# Patient Record
Sex: FEMALE | Race: WHITE | NOT HISPANIC OR LATINO | Employment: FULL TIME | URBAN - METROPOLITAN AREA
[De-identification: names, ages, dates, MRNs, and addresses within clinical notes are randomized per-mention and may not be internally consistent; named-entity substitution may affect disease eponyms.]

---

## 2020-12-02 ENCOUNTER — OFFICE VISIT (OUTPATIENT)
Dept: FAMILY MEDICINE CLINIC | Facility: CLINIC | Age: 35
End: 2020-12-02

## 2020-12-02 VITALS
WEIGHT: 202 LBS | SYSTOLIC BLOOD PRESSURE: 104 MMHG | HEART RATE: 72 BPM | OXYGEN SATURATION: 99 % | TEMPERATURE: 96.8 F | DIASTOLIC BLOOD PRESSURE: 62 MMHG | HEIGHT: 69 IN | BODY MASS INDEX: 29.92 KG/M2

## 2020-12-02 DIAGNOSIS — E03.9 HYPOTHYROIDISM, UNSPECIFIED TYPE: ICD-10-CM

## 2020-12-02 DIAGNOSIS — E06.3 HASHIMOTO'S DISEASE: Primary | ICD-10-CM

## 2020-12-02 DIAGNOSIS — R63.5 WEIGHT GAIN: ICD-10-CM

## 2020-12-02 PROBLEM — M47.16 LUMBAR SPONDYLOSIS WITH MYELOPATHY: Status: ACTIVE | Noted: 2020-08-23

## 2020-12-02 PROBLEM — S99.912A ANKLE INJURY, LEFT, INITIAL ENCOUNTER: Status: ACTIVE | Noted: 2020-08-22

## 2020-12-02 PROBLEM — M51.16 LUMBAR DISC HERNIATION WITH RADICULOPATHY: Status: ACTIVE | Noted: 2020-08-22

## 2020-12-02 PROCEDURE — 99213 OFFICE O/P EST LOW 20 MIN: CPT | Performed by: INTERNAL MEDICINE

## 2020-12-02 RX ORDER — DIPHENHYDRAMINE HCL 25 MG
25 CAPSULE ORAL AS NEEDED
COMMUNITY

## 2020-12-02 RX ORDER — LEVOTHYROXINE SODIUM 125 MCG
125 TABLET ORAL
COMMUNITY
Start: 2020-10-22 | End: 2021-08-18 | Stop reason: SDUPTHER

## 2020-12-02 NOTE — PROGRESS NOTES
MIRLANDE Huber is a 34 y.o. female.     Chief Complaint   Patient presents with   • Hypothyroidism   • Weight Check     going up       History of Present Illness   History of hypothyroidism.  Taking Synthroid 125 mcg daily.  Reports she usually has a TSH level about 2.5.  Last time at 3.77.  She was on keto diet it actually caused her reflex  Weight gain.  She has gained more than 30 pounds.  She is with a  dieting exercising.  She is very specific about diet and exercise.  Continues to gain weight.  Feels tired.  Last time she had a vitamin D level checked was 23.  The following portions of the patient's history were reviewed and updated as appropriate: allergies, current medications, past family history, past medical history, past social history, past surgical history and problem list.    Review of Systems   Constitutional: Negative.  Negative for activity change, appetite change, fatigue and fever.   Eyes: Negative for blurred vision and double vision.   Respiratory: Negative.  Negative for shortness of breath.    Cardiovascular: Negative for chest pain, palpitations and leg swelling.   Gastrointestinal: Negative.    Musculoskeletal: Negative for arthralgias and back pain.   Neurological: Negative for dizziness, syncope, light-headedness and headache.   Psychiatric/Behavioral: Negative for negative for hyperactivity and depressed mood.       No Known Allergies    Current Outpatient Medications on File Prior to Visit   Medication Sig Dispense Refill   • diphenhydrAMINE (BENADRYL) 25 mg capsule Take 25 mg by mouth As Needed.     • Synthroid 125 MCG tablet Take 125 tablets by mouth Every Morning Before Breakfast.       No current facility-administered medications on file prior to visit.        History reviewed. No pertinent family history.    Past Medical History:   Diagnosis Date   • Hypothyroidism        History reviewed. No pertinent surgical history.    Social History     Socioeconomic  "History   • Marital status: Single     Spouse name: Not on file   • Number of children: Not on file   • Years of education: Not on file   • Highest education level: Not on file   Tobacco Use   • Smoking status: Never Smoker   • Smokeless tobacco: Never Used   Substance and Sexual Activity   • Alcohol use: Never     Frequency: Never   • Drug use: Never   • Sexual activity: Defer       Patient Active Problem List   Diagnosis   • Ankle injury, left, initial encounter   • Benign neoplasm of cerebral meninges (CMS/HCC)   • Lumbar disc herniation with radiculopathy   • Lumbar spondylosis with myelopathy   • Hashimoto's disease   • Hypothyroidism       /62 (BP Location: Right arm, Patient Position: Sitting, Cuff Size: Adult)   Pulse 72   Temp 96.8 °F (36 °C) (Temporal)   Ht 174 cm (68.5\")   Wt 91.6 kg (202 lb)   SpO2 99%   BMI 30.27 kg/m²   Body mass index is 30.27 kg/m².    Objective   Physical Exam  Vitals signs and nursing note reviewed.   Constitutional:       Appearance: She is well-developed.   Eyes:      Pupils: Pupils are equal, round, and reactive to light.   Neck:      Musculoskeletal: Normal range of motion and neck supple.      Thyroid: No thyromegaly.      Vascular: No JVD.      Trachea: No tracheal deviation.   Cardiovascular:      Rate and Rhythm: Normal rate and regular rhythm.      Heart sounds: No murmur.   Pulmonary:      Effort: Pulmonary effort is normal. No respiratory distress.      Breath sounds: Normal breath sounds. No wheezing.   Abdominal:      General: Bowel sounds are normal. There is no distension.      Palpations: Abdomen is soft. There is no mass.      Tenderness: There is no abdominal tenderness. There is no right CVA tenderness, left CVA tenderness, guarding or rebound.      Hernia: No hernia is present.   Lymphadenopathy:      Cervical: No cervical adenopathy.   Neurological:      General: No focal deficit present.      Mental Status: She is alert and oriented to person, " place, and time. Mental status is at baseline.      Cranial Nerves: No cranial nerve deficit.      Sensory: No sensory deficit.      Motor: No weakness.      Coordination: Coordination normal.      Gait: Gait normal.      Deep Tendon Reflexes: Reflexes normal.   Psychiatric:         Mood and Affect: Mood normal.         Behavior: Behavior normal.           Assessment/Plan   Diagnoses and all orders for this visit:    1. Hashimoto's disease (Primary)  -     CBC & Differential  -     Comprehensive Metabolic Panel  -     TSH  -     T4, Free  -     Thyroid Peroxidase Antibody  -     T3, Free  -     Ambulatory Referral to Endocrinology    2. Hypothyroidism, unspecified type  -     CBC & Differential  -     Comprehensive Metabolic Panel  -     TSH  -     T4, Free  -     Thyroid Peroxidase Antibody  -     T3, Free  -     Ambulatory Referral to Endocrinology    3. Weight gain  -     CBC & Differential  -     Comprehensive Metabolic Panel  -     TSH  -     T4, Free  -     Thyroid Peroxidase Antibody  -     T3, Free  -     Ambulatory Referral to Endocrinology    Patient want to see Dr. Sheth endocrinologist.  Referral made.  We will try to keep her TSH near 2.  Awaiting labs to come back.  Continue with strainer.  Diet and exercise.

## 2020-12-03 LAB
ALBUMIN SERPL-MCNC: 4.6 G/DL (ref 3.5–5.2)
ALBUMIN/GLOB SERPL: 1.8 G/DL
ALP SERPL-CCNC: 62 U/L (ref 39–117)
ALT SERPL-CCNC: 15 U/L (ref 1–33)
AST SERPL-CCNC: 15 U/L (ref 1–32)
BASOPHILS # BLD AUTO: 0 10*3/MM3 (ref 0–0.2)
BASOPHILS NFR BLD AUTO: 0 % (ref 0–1.5)
BILIRUB SERPL-MCNC: 0.3 MG/DL (ref 0–1.2)
BUN SERPL-MCNC: 8 MG/DL (ref 6–20)
BUN/CREAT SERPL: 10.1 (ref 7–25)
CALCIUM SERPL-MCNC: 9.1 MG/DL (ref 8.6–10.5)
CHLORIDE SERPL-SCNC: 99 MMOL/L (ref 98–107)
CO2 SERPL-SCNC: 24.8 MMOL/L (ref 22–29)
CREAT SERPL-MCNC: 0.79 MG/DL (ref 0.57–1)
EOSINOPHIL # BLD AUTO: 0 10*3/MM3 (ref 0–0.4)
EOSINOPHIL NFR BLD AUTO: 0 % (ref 0.3–6.2)
ERYTHROCYTE [DISTWIDTH] IN BLOOD BY AUTOMATED COUNT: 12.4 % (ref 12.3–15.4)
GLOBULIN SER CALC-MCNC: 2.5 GM/DL
GLUCOSE SERPL-MCNC: 94 MG/DL (ref 65–99)
HCT VFR BLD AUTO: 40.6 % (ref 34–46.6)
HGB BLD-MCNC: 13.6 G/DL (ref 12–15.9)
IMM GRANULOCYTES # BLD AUTO: 0.01 10*3/MM3 (ref 0–0.05)
IMM GRANULOCYTES NFR BLD AUTO: 0.3 % (ref 0–0.5)
LYMPHOCYTES # BLD AUTO: 1.2 10*3/MM3 (ref 0.7–3.1)
LYMPHOCYTES NFR BLD AUTO: 32.2 % (ref 19.6–45.3)
MCH RBC QN AUTO: 30.2 PG (ref 26.6–33)
MCHC RBC AUTO-ENTMCNC: 33.5 G/DL (ref 31.5–35.7)
MCV RBC AUTO: 90.2 FL (ref 79–97)
MONOCYTES # BLD AUTO: 0.32 10*3/MM3 (ref 0.1–0.9)
MONOCYTES NFR BLD AUTO: 8.6 % (ref 5–12)
NEUTROPHILS # BLD AUTO: 2.2 10*3/MM3 (ref 1.7–7)
NEUTROPHILS NFR BLD AUTO: 58.9 % (ref 42.7–76)
NRBC BLD AUTO-RTO: 0 /100 WBC (ref 0–0.2)
PLATELET # BLD AUTO: 216 10*3/MM3 (ref 140–450)
POTASSIUM SERPL-SCNC: 4.3 MMOL/L (ref 3.5–5.2)
PROT SERPL-MCNC: 7.1 G/DL (ref 6–8.5)
RBC # BLD AUTO: 4.5 10*6/MM3 (ref 3.77–5.28)
SODIUM SERPL-SCNC: 137 MMOL/L (ref 136–145)
T3FREE SERPL-MCNC: 3 PG/ML (ref 2–4.4)
T4 FREE SERPL-MCNC: 1.41 NG/DL (ref 0.93–1.7)
THYROPEROXIDASE AB SERPL-ACNC: 163 IU/ML (ref 0–34)
TSH SERPL DL<=0.005 MIU/L-ACNC: 1 UIU/ML (ref 0.27–4.2)
WBC # BLD AUTO: 3.73 10*3/MM3 (ref 3.4–10.8)

## 2021-02-25 ENCOUNTER — TELEPHONE (OUTPATIENT)
Dept: FAMILY MEDICINE CLINIC | Facility: CLINIC | Age: 36
End: 2021-02-25

## 2021-02-25 NOTE — TELEPHONE ENCOUNTER
Caller: Corby Huber    Relationship: Self    Best call back number: [phone] (H)    What test was performed: BLOOD WORK    When was the test performed: 12/03/2020    Where was the test performed: [facility]    Additional notes: PATIENT WOULD LIKE TO COME AND  COPIES OF RESULTS

## 2021-04-19 ENCOUNTER — OFFICE VISIT (OUTPATIENT)
Dept: FAMILY MEDICINE CLINIC | Facility: CLINIC | Age: 36
End: 2021-04-19

## 2021-04-19 ENCOUNTER — HOSPITAL ENCOUNTER (OUTPATIENT)
Dept: MRI IMAGING | Facility: HOSPITAL | Age: 36
Discharge: HOME | End: 2021-04-19
Admitting: INTERNAL MEDICINE

## 2021-04-19 VITALS
HEIGHT: 69 IN | WEIGHT: 182 LBS | HEART RATE: 67 BPM | DIASTOLIC BLOOD PRESSURE: 68 MMHG | TEMPERATURE: 97.1 F | BODY MASS INDEX: 26.96 KG/M2 | SYSTOLIC BLOOD PRESSURE: 112 MMHG | OXYGEN SATURATION: 98 %

## 2021-04-19 DIAGNOSIS — M54.42 ACUTE BILATERAL LOW BACK PAIN WITH LEFT-SIDED SCIATICA: Primary | ICD-10-CM

## 2021-04-19 DIAGNOSIS — E03.9 HYPOTHYROIDISM, UNSPECIFIED TYPE: ICD-10-CM

## 2021-04-19 DIAGNOSIS — M54.42 ACUTE BILATERAL LOW BACK PAIN WITH LEFT-SIDED SCIATICA: ICD-10-CM

## 2021-04-19 DIAGNOSIS — E06.3 HASHIMOTO'S DISEASE: ICD-10-CM

## 2021-04-19 PROCEDURE — 72148 MRI LUMBAR SPINE W/O DYE: CPT

## 2021-04-19 PROCEDURE — 99214 OFFICE O/P EST MOD 30 MIN: CPT | Performed by: INTERNAL MEDICINE

## 2021-04-19 RX ORDER — METHYLPREDNISOLONE 4 MG/1
TABLET ORAL
Qty: 1 EACH | Refills: 0 | Status: SHIPPED | OUTPATIENT
Start: 2021-04-19 | End: 2021-09-08

## 2021-04-19 NOTE — PROGRESS NOTES
Subjective   Corby Huber is a 35 y.o. female.     Chief Complaint   Patient presents with   • Back Pain   • Numbness   Hashimoto's    History of Present Illness   Patient came in for acute onset of back pain for more than 1 week.  Reports while she was doing yoga she hurt herself.  Pain going down from her back down to her legs.  Going down to her legs below her knees.  Some numbness on the left side.  Pain is across the back.  She has been taking Skelaxin, Motrin with no relief.  No fever chills, nausea vomiting or dysuria.  Bowel and bladder under control.  Last when she was treated for UTI symptoms are resolved.  Patient also has Hashimoto's her endocrinologist has left.  She is on the medication at this time.  Wants labs done.  Patient wants to come here for follow-up.  The following portions of the patient's history were reviewed and updated as appropriate: allergies, current medications, past family history, past medical history, past social history, past surgical history and problem list.    Review of Systems   Constitutional: Negative for activity change, appetite change, fatigue and fever.   Eyes: Negative for blurred vision and double vision.   Respiratory: Negative for shortness of breath.    Cardiovascular: Negative for chest pain, palpitations and leg swelling.   Gastrointestinal: Negative.    Endocrine: Negative.    Musculoskeletal: Positive for back pain.   Neurological: Negative for dizziness, syncope, light-headedness and headache.       No Known Allergies    Current Outpatient Medications on File Prior to Visit   Medication Sig Dispense Refill   • diphenhydrAMINE (BENADRYL) 25 mg capsule Take 25 mg by mouth As Needed.     • Synthroid 125 MCG tablet Take 125 tablets by mouth Every Morning Before Breakfast.       No current facility-administered medications on file prior to visit.       No family history on file.    Past Medical History:   Diagnosis Date   • Hypothyroidism        No past surgical  "history on file.    Social History     Socioeconomic History   • Marital status: Single     Spouse name: Not on file   • Number of children: Not on file   • Years of education: Not on file   • Highest education level: Not on file   Tobacco Use   • Smoking status: Never Smoker   • Smokeless tobacco: Never Used   Substance and Sexual Activity   • Alcohol use: Never   • Drug use: Never   • Sexual activity: Defer       Patient Active Problem List   Diagnosis   • Ankle injury, left, initial encounter   • Benign neoplasm of cerebral meninges (CMS/HCC)   • Lumbar disc herniation with radiculopathy   • Lumbar spondylosis with myelopathy   • Hashimoto's disease   • Hypothyroidism       /68 (BP Location: Right arm, Patient Position: Sitting, Cuff Size: Adult)   Pulse 67   Temp 97.1 °F (36.2 °C) (Temporal)   Ht 174 cm (68.5\")   Wt 82.6 kg (182 lb)   SpO2 98%   BMI 27.27 kg/m²   Body mass index is 27.27 kg/m².    Objective   Physical Exam  Vitals and nursing note reviewed.   Constitutional:       Appearance: She is well-developed.   Neck:      Thyroid: No thyromegaly.      Vascular: No JVD.      Trachea: No tracheal deviation.   Cardiovascular:      Rate and Rhythm: Normal rate and regular rhythm.      Heart sounds: No murmur heard.     Pulmonary:      Effort: Pulmonary effort is normal. No respiratory distress.      Breath sounds: Normal breath sounds. No wheezing.   Abdominal:      General: Bowel sounds are normal. There is no distension.      Palpations: Abdomen is soft. There is no mass.      Tenderness: There is no abdominal tenderness. There is no right CVA tenderness, left CVA tenderness, guarding or rebound.      Hernia: No hernia is present.   Musculoskeletal:      Cervical back: Normal range of motion and neck supple.      Comments:  to palpation across the lower back.  SLR positive on the left side at 25 degree angle.  DTR 2+, motor 5/5.  No foot drop.   Lymphadenopathy:      Cervical: No " cervical adenopathy.   Neurological:      General: No focal deficit present.      Mental Status: She is alert and oriented to person, place, and time. Mental status is at baseline.      Cranial Nerves: No cranial nerve deficit.      Sensory: No sensory deficit.      Motor: No weakness.      Coordination: Coordination normal.      Gait: Gait normal.      Deep Tendon Reflexes: Reflexes normal.   Psychiatric:         Mood and Affect: Mood normal.         Behavior: Behavior normal.           Assessment/Plan   Diagnoses and all orders for this visit:    1. Acute bilateral low back pain with left-sided sciatica (Primary)  -     MRI Lumbar Spine Without Contrast; Future  -     CBC & Differential  -     Comprehensive Metabolic Panel  -     T4, Free  -     TSH  -     Sedimentation Rate  -     T3, Free  -     Thyroid Peroxidase Antibody; Future    2. Hashimoto's disease  -     CBC & Differential  -     Comprehensive Metabolic Panel  -     T4, Free  -     TSH  -     Sedimentation Rate  -     T3, Free  -     Thyroid Peroxidase Antibody; Future    3. Hypothyroidism, unspecified type  -     CBC & Differential  -     Comprehensive Metabolic Panel  -     T4, Free  -     TSH  -     Sedimentation Rate  -     T3, Free  -     Thyroid Peroxidase Antibody; Future    Other orders  -     methylPREDNISolone (MEDROL) 4 MG dose pack; Take as directed on package instructions.  Dispense: 1 each; Refill: 0    Continue Synthroid 125 mics daily.  Continue diet and exercise continue to lose weight.  Hold on doing yoga at this time.  Continue walking on a flat surface.  Continue Skelaxin, Motrin, Medrol Dosepak prescribed.  Get MRI of the lumbar spine.  Patient has symptoms of sciatica.  Send patient for physical therapy.  I have also asked her to go for therapeutic massages.  Return depending on MRI finding.  Cussed with patient if foot drop, or problem with the bladder and bowel control needs to go to the ER.

## 2021-04-20 LAB
ALBUMIN SERPL-MCNC: 4.9 G/DL (ref 3.5–5.2)
ALBUMIN/GLOB SERPL: 2.2 G/DL
ALP SERPL-CCNC: 67 U/L (ref 39–117)
ALT SERPL-CCNC: 14 U/L (ref 1–33)
AST SERPL-CCNC: 16 U/L (ref 1–32)
BASOPHILS # BLD AUTO: 0.01 10*3/MM3 (ref 0–0.2)
BASOPHILS NFR BLD AUTO: 0.2 % (ref 0–1.5)
BILIRUB SERPL-MCNC: 0.3 MG/DL (ref 0–1.2)
BUN SERPL-MCNC: 15 MG/DL (ref 6–20)
BUN/CREAT SERPL: 20 (ref 7–25)
CALCIUM SERPL-MCNC: 9.7 MG/DL (ref 8.6–10.5)
CHLORIDE SERPL-SCNC: 103 MMOL/L (ref 98–107)
CO2 SERPL-SCNC: 24.9 MMOL/L (ref 22–29)
CREAT SERPL-MCNC: 0.75 MG/DL (ref 0.57–1)
EOSINOPHIL # BLD AUTO: 0 10*3/MM3 (ref 0–0.4)
EOSINOPHIL NFR BLD AUTO: 0 % (ref 0.3–6.2)
ERYTHROCYTE [DISTWIDTH] IN BLOOD BY AUTOMATED COUNT: 12.5 % (ref 12.3–15.4)
ERYTHROCYTE [SEDIMENTATION RATE] IN BLOOD BY WESTERGREN METHOD: 7 MM/HR (ref 0–20)
GLOBULIN SER CALC-MCNC: 2.2 GM/DL
GLUCOSE SERPL-MCNC: 102 MG/DL (ref 65–99)
HCT VFR BLD AUTO: 43.9 % (ref 34–46.6)
HGB BLD-MCNC: 14.7 G/DL (ref 12–15.9)
IMM GRANULOCYTES # BLD AUTO: 0.01 10*3/MM3 (ref 0–0.05)
IMM GRANULOCYTES NFR BLD AUTO: 0.2 % (ref 0–0.5)
LYMPHOCYTES # BLD AUTO: 1.72 10*3/MM3 (ref 0.7–3.1)
LYMPHOCYTES NFR BLD AUTO: 31.9 % (ref 19.6–45.3)
MCH RBC QN AUTO: 30.8 PG (ref 26.6–33)
MCHC RBC AUTO-ENTMCNC: 33.5 G/DL (ref 31.5–35.7)
MCV RBC AUTO: 92 FL (ref 79–97)
MONOCYTES # BLD AUTO: 0.45 10*3/MM3 (ref 0.1–0.9)
MONOCYTES NFR BLD AUTO: 8.3 % (ref 5–12)
NEUTROPHILS # BLD AUTO: 3.2 10*3/MM3 (ref 1.7–7)
NEUTROPHILS NFR BLD AUTO: 59.4 % (ref 42.7–76)
NRBC BLD AUTO-RTO: 0 /100 WBC (ref 0–0.2)
PLATELET # BLD AUTO: 253 10*3/MM3 (ref 140–450)
POTASSIUM SERPL-SCNC: 4.9 MMOL/L (ref 3.5–5.2)
PROT SERPL-MCNC: 7.1 G/DL (ref 6–8.5)
RBC # BLD AUTO: 4.77 10*6/MM3 (ref 3.77–5.28)
SODIUM SERPL-SCNC: 138 MMOL/L (ref 136–145)
T3FREE SERPL-MCNC: 2.6 PG/ML (ref 2–4.4)
T4 FREE SERPL-MCNC: 1.25 NG/DL (ref 0.93–1.7)
TSH SERPL DL<=0.005 MIU/L-ACNC: 3.78 UIU/ML (ref 0.27–4.2)
WBC # BLD AUTO: 5.39 10*3/MM3 (ref 3.4–10.8)

## 2021-05-04 ENCOUNTER — OFFICE VISIT (OUTPATIENT)
Dept: FAMILY MEDICINE CLINIC | Facility: CLINIC | Age: 36
End: 2021-05-04

## 2021-05-04 VITALS
SYSTOLIC BLOOD PRESSURE: 112 MMHG | RESPIRATION RATE: 16 BRPM | DIASTOLIC BLOOD PRESSURE: 62 MMHG | BODY MASS INDEX: 26.97 KG/M2 | HEART RATE: 72 BPM | TEMPERATURE: 98.6 F | WEIGHT: 182.1 LBS | OXYGEN SATURATION: 99 % | HEIGHT: 69 IN

## 2021-05-04 DIAGNOSIS — M54.41 ACUTE BILATERAL LOW BACK PAIN WITH BILATERAL SCIATICA: Primary | ICD-10-CM

## 2021-05-04 DIAGNOSIS — M54.42 ACUTE BILATERAL LOW BACK PAIN WITH BILATERAL SCIATICA: Primary | ICD-10-CM

## 2021-05-04 PROCEDURE — 99212 OFFICE O/P EST SF 10 MIN: CPT | Performed by: INTERNAL MEDICINE

## 2021-05-04 NOTE — PROGRESS NOTES
Jade Huber is a 35 y.o. female.     Chief Complaint   Patient presents with   • Back Pain     MRI results        History of Present Illness   Follow-up for low back pain.  Reports much better.  Getting physical therapy.  Start back on stretching exercises.  Taking Skelaxin as needed.  The following portions of the patient's history were reviewed and updated as appropriate: allergies, current medications, past family history, past medical history, past social history, past surgical history and problem list.    Review of Systems   Constitutional: Negative for activity change, appetite change, fatigue and fever.   Eyes: Negative for blurred vision and double vision.   Respiratory: Negative for shortness of breath.    Cardiovascular: Negative for chest pain, palpitations and leg swelling.   Genitourinary: Negative for dysuria and hematuria.   Musculoskeletal: Positive for back pain.   Neurological: Negative for dizziness, syncope, light-headedness and headache.   Psychiatric/Behavioral: Negative for agitation, behavioral problems and decreased concentration.       No Known Allergies    Current Outpatient Medications on File Prior to Visit   Medication Sig Dispense Refill   • diphenhydrAMINE (BENADRYL) 25 mg capsule Take 25 mg by mouth As Needed.     • Synthroid 125 MCG tablet Take 125 tablets by mouth Every Morning Before Breakfast.     • methylPREDNISolone (MEDROL) 4 MG dose pack Take as directed on package instructions. 1 each 0     No current facility-administered medications on file prior to visit.       History reviewed. No pertinent family history.    Past Medical History:   Diagnosis Date   • Hypothyroidism        History reviewed. No pertinent surgical history.    Social History     Socioeconomic History   • Marital status: Single     Spouse name: Not on file   • Number of children: Not on file   • Years of education: Not on file   • Highest education level: Not on file   Tobacco Use   • Smoking  "status: Never Smoker   • Smokeless tobacco: Never Used   Substance and Sexual Activity   • Alcohol use: Never   • Drug use: Never   • Sexual activity: Defer       Patient Active Problem List   Diagnosis   • Ankle injury, left, initial encounter   • Benign neoplasm of cerebral meninges (CMS/HCC)   • Lumbar disc herniation with radiculopathy   • Lumbar spondylosis with myelopathy   • Hashimoto's disease   • Hypothyroidism       /62   Pulse 72   Temp 98.6 °F (37 °C) (Temporal)   Resp 16   Ht 174 cm (68.5\")   Wt 82.6 kg (182 lb 1.6 oz)   SpO2 99%   BMI 27.28 kg/m²   Body mass index is 27.28 kg/m².    Objective   Physical Exam  Vitals and nursing note reviewed.   Constitutional:       Appearance: She is well-developed.   Neck:      Thyroid: No thyromegaly.      Vascular: No JVD.      Trachea: No tracheal deviation.   Cardiovascular:      Rate and Rhythm: Normal rate and regular rhythm.      Heart sounds: No murmur heard.     Pulmonary:      Effort: Pulmonary effort is normal. No respiratory distress.      Breath sounds: Normal breath sounds. No wheezing.   Abdominal:      General: Bowel sounds are normal.      Palpations: Abdomen is soft.   Musculoskeletal:      Cervical back: Normal range of motion and neck supple.      Comments: Positive tenderness across the lower back.  SLR negative.  DTR 2+, motor 5/5   Lymphadenopathy:      Cervical: No cervical adenopathy.   Neurological:      General: No focal deficit present.      Mental Status: She is alert and oriented to person, place, and time. Mental status is at baseline.           Assessment/Plan   Diagnoses and all orders for this visit:    1. Acute bilateral low back pain with bilateral sciatica (Primary)    Continue diet and exercise.  Continue with physical therapy, as needed Skelaxin.  Overall patient is getting better.  MRI did not show anything significant.  Return as needed.       "

## 2021-08-18 ENCOUNTER — TELEPHONE (OUTPATIENT)
Dept: FAMILY MEDICINE CLINIC | Facility: CLINIC | Age: 36
End: 2021-08-18

## 2021-08-18 RX ORDER — LEVOTHYROXINE SODIUM 125 MCG
125 TABLET ORAL
Qty: 30 TABLET | Refills: 3 | Status: SHIPPED | OUTPATIENT
Start: 2021-08-18 | End: 2022-01-19

## 2021-08-18 RX ORDER — LEVOTHYROXINE SODIUM 125 MCG
15625 TABLET ORAL
Status: CANCELLED | OUTPATIENT
Start: 2021-08-18

## 2021-08-18 NOTE — TELEPHONE ENCOUNTER
Caller: Corby Huber    Relationship: Self     Best call back number: [phone]    Medication needed:   Requested Prescriptions     Pending Prescriptions Disp Refills   • Synthroid 125 MCG tablet       Sig: Take 125 tablets by mouth Every Morning Before Breakfast.       When do you need the refill by: ASAP    What additional details did the patient provide when requesting the medication:  PATIENT IS OUT    Does the patient have less than a 3 day supply:  [x] Yes  [] No    What is the patient's preferred pharmacy: [facility] [address] [place] - [phone] [facility] [phone] [facility]

## 2021-09-02 ENCOUNTER — TELEPHONE (OUTPATIENT)
Dept: FAMILY MEDICINE CLINIC | Facility: CLINIC | Age: 36
End: 2021-09-02

## 2021-09-02 NOTE — TELEPHONE ENCOUNTER
Caller: Corby Hbuer    Relationship: Self    Best call back number: [phone]    What orders are you requesting (i.e. lab or imaging): CT SCAN    In what timeframe would the patient need to come in: ASAP    Where will you receive your lab/imaging services: DR. NICOLE, [facility]     Additional notes: THERE PATIENT IS REQUESTING THAT DR. POPE GIVE HER AN ORDER FOR A CT SCAN. THE PATIENT WOULD LIKE TO HAVE A CT SCAN DONE DUE TO HER CHRONIC HEADACHES. THE PATIENT STATES THAT SHE WOULD LIKE TO HAVE THE CT COMPLETED BEFORE HER APPOINTMENT 09/08/2021 SO SHE CAN GO OVER RESULTS WITH HIM. PLEASE ADVISE.

## 2021-09-08 ENCOUNTER — OFFICE VISIT (OUTPATIENT)
Dept: FAMILY MEDICINE CLINIC | Facility: CLINIC | Age: 36
End: 2021-09-08

## 2021-09-08 VITALS
WEIGHT: 192 LBS | TEMPERATURE: 97.1 F | HEART RATE: 79 BPM | SYSTOLIC BLOOD PRESSURE: 122 MMHG | OXYGEN SATURATION: 98 % | BODY MASS INDEX: 28.44 KG/M2 | HEIGHT: 69 IN | DIASTOLIC BLOOD PRESSURE: 74 MMHG

## 2021-09-08 DIAGNOSIS — E06.3 HASHIMOTO'S DISEASE: Primary | ICD-10-CM

## 2021-09-08 DIAGNOSIS — E03.9 HYPOTHYROIDISM, UNSPECIFIED TYPE: ICD-10-CM

## 2021-09-08 DIAGNOSIS — R51.9 INTRACTABLE HEADACHE, UNSPECIFIED CHRONICITY PATTERN, UNSPECIFIED HEADACHE TYPE: ICD-10-CM

## 2021-09-08 LAB
ALBUMIN SERPL-MCNC: 5.2 G/DL (ref 3.5–5.2)
ALBUMIN/GLOB SERPL: 2.3 G/DL
ALP SERPL-CCNC: 72 U/L (ref 39–117)
ALT SERPL-CCNC: 10 U/L (ref 1–33)
AST SERPL-CCNC: 15 U/L (ref 1–32)
BASOPHILS # BLD AUTO: 0.01 10*3/MM3 (ref 0–0.2)
BASOPHILS NFR BLD AUTO: 0.2 % (ref 0–1.5)
BILIRUB SERPL-MCNC: 0.4 MG/DL (ref 0–1.2)
BUN SERPL-MCNC: 12 MG/DL (ref 6–20)
BUN/CREAT SERPL: 16 (ref 7–25)
CALCIUM SERPL-MCNC: 10 MG/DL (ref 8.6–10.5)
CHLORIDE SERPL-SCNC: 102 MMOL/L (ref 98–107)
CO2 SERPL-SCNC: 25.6 MMOL/L (ref 22–29)
CREAT SERPL-MCNC: 0.75 MG/DL (ref 0.57–1)
EOSINOPHIL # BLD AUTO: 0 10*3/MM3 (ref 0–0.4)
EOSINOPHIL NFR BLD AUTO: 0 % (ref 0.3–6.2)
ERYTHROCYTE [DISTWIDTH] IN BLOOD BY AUTOMATED COUNT: 12.8 % (ref 12.3–15.4)
GLOBULIN SER CALC-MCNC: 2.3 GM/DL
GLUCOSE SERPL-MCNC: 95 MG/DL (ref 65–99)
HCT VFR BLD AUTO: 45.6 % (ref 34–46.6)
HGB BLD-MCNC: 15 G/DL (ref 12–15.9)
IMM GRANULOCYTES # BLD AUTO: 0.01 10*3/MM3 (ref 0–0.05)
IMM GRANULOCYTES NFR BLD AUTO: 0.2 % (ref 0–0.5)
LYMPHOCYTES # BLD AUTO: 1.78 10*3/MM3 (ref 0.7–3.1)
LYMPHOCYTES NFR BLD AUTO: 32.2 % (ref 19.6–45.3)
MCH RBC QN AUTO: 30.2 PG (ref 26.6–33)
MCHC RBC AUTO-ENTMCNC: 32.9 G/DL (ref 31.5–35.7)
MCV RBC AUTO: 91.9 FL (ref 79–97)
MONOCYTES # BLD AUTO: 0.56 10*3/MM3 (ref 0.1–0.9)
MONOCYTES NFR BLD AUTO: 10.1 % (ref 5–12)
NEUTROPHILS # BLD AUTO: 3.16 10*3/MM3 (ref 1.7–7)
NEUTROPHILS NFR BLD AUTO: 57.3 % (ref 42.7–76)
NRBC BLD AUTO-RTO: 0 /100 WBC (ref 0–0.2)
PLATELET # BLD AUTO: 207 10*3/MM3 (ref 140–450)
POTASSIUM SERPL-SCNC: 4.6 MMOL/L (ref 3.5–5.2)
PROT SERPL-MCNC: 7.5 G/DL (ref 6–8.5)
RBC # BLD AUTO: 4.96 10*6/MM3 (ref 3.77–5.28)
SODIUM SERPL-SCNC: 138 MMOL/L (ref 136–145)
T4 FREE SERPL-MCNC: 1.48 NG/DL (ref 0.93–1.7)
TSH SERPL DL<=0.005 MIU/L-ACNC: 3.95 UIU/ML (ref 0.27–4.2)
WBC # BLD AUTO: 5.52 10*3/MM3 (ref 3.4–10.8)

## 2021-09-08 PROCEDURE — 99214 OFFICE O/P EST MOD 30 MIN: CPT | Performed by: INTERNAL MEDICINE

## 2021-09-08 RX ORDER — SUMATRIPTAN 50 MG/1
TABLET, FILM COATED ORAL
Qty: 10 TABLET | Refills: 3 | Status: SHIPPED | OUTPATIENT
Start: 2021-09-08

## 2021-09-08 RX ORDER — BUTALBITAL, ACETAMINOPHEN, CAFFEINE AND CODEINE PHOSPHATE 300; 50; 40; 30 MG/1; MG/1; MG/1; MG/1
1 CAPSULE ORAL 3 TIMES DAILY PRN
Qty: 30 CAPSULE | Refills: 0 | Status: SHIPPED | OUTPATIENT
Start: 2021-09-08

## 2021-09-08 RX ORDER — PROMETHAZINE HCL 50 MG
25 TABLET ORAL EVERY 6 HOURS PRN
Qty: 12 TABLET | Refills: 1 | Status: SHIPPED | OUTPATIENT
Start: 2021-09-08

## 2021-09-08 NOTE — PROGRESS NOTES
Subjective   Corby Huber is a 35 y.o. female.     Chief Complaint   Patient presents with   • Headache   • Sinus pain and Pressure       History of Present Illness   Patient with a history of hypothyroidism on thyroid medications. Reports she was having severe headaches for last 3 weeks. Wakes up with headache and states with a headache. She had a MRA, CT angiogram,, CT of the head last year. She has been followed by neurosurgery. No focal deficit. No change in vision. Nothing that triggers the headache. She has had a nausea and vomiting. No fever. Reports the vomiting comes with headache getting worse. She went treated herself with Zithromax did not help. She had missed few of her thyroid medications.  The following portions of the patient's history were reviewed and updated as appropriate: allergies, current medications, past family history, past medical history, past social history, past surgical history and problem list.    Review of Systems   Constitutional: Negative for activity change, appetite change, fatigue and fever.   Eyes: Negative for blurred vision and double vision.   Respiratory: Negative for shortness of breath.    Cardiovascular: Negative for chest pain, palpitations and leg swelling.   Gastrointestinal: Positive for nausea and vomiting. Negative for abdominal distention, abdominal pain, anal bleeding, blood in stool, constipation, diarrhea, GERD and indigestion.   Genitourinary: Negative for frequency and hematuria.   Musculoskeletal: Negative for arthralgias and back pain.   Neurological: Positive for headache. Negative for dizziness, tremors, seizures, syncope, facial asymmetry, speech difficulty, weakness, light-headedness, numbness, memory problem and confusion.   Psychiatric/Behavioral: Negative for agitation, behavioral problems and decreased concentration.       No Known Allergies    Current Outpatient Medications on File Prior to Visit   Medication Sig Dispense Refill   • diphenhydrAMINE  "(BENADRYL) 25 mg capsule Take 25 mg by mouth As Needed.     • Synthroid 125 MCG tablet Take 1 tablet by mouth Every Morning Before Breakfast. 30 tablet 3   • [DISCONTINUED] methylPREDNISolone (MEDROL) 4 MG dose pack Take as directed on package instructions. 1 each 0     No current facility-administered medications on file prior to visit.       History reviewed. No pertinent family history.    Past Medical History:   Diagnosis Date   • Hypothyroidism        History reviewed. No pertinent surgical history.    Social History     Socioeconomic History   • Marital status: Single     Spouse name: Not on file   • Number of children: Not on file   • Years of education: Not on file   • Highest education level: Not on file   Tobacco Use   • Smoking status: Never Smoker   • Smokeless tobacco: Never Used   Substance and Sexual Activity   • Alcohol use: Never   • Drug use: Never   • Sexual activity: Defer       Patient Active Problem List   Diagnosis   • Ankle injury, left, initial encounter   • Benign neoplasm of cerebral meninges (CMS/HCC)   • Lumbar disc herniation with radiculopathy   • Lumbar spondylosis with myelopathy   • Hashimoto's disease   • Hypothyroidism       /74 (BP Location: Left arm, Patient Position: Sitting, Cuff Size: Adult)   Pulse 79   Temp 97.1 °F (36.2 °C) (Temporal)   Ht 174 cm (68.5\")   Wt 87.1 kg (192 lb)   SpO2 98%   BMI 28.77 kg/m²   Body mass index is 28.77 kg/m².    Objective   Physical Exam  Vitals and nursing note reviewed.   Constitutional:       Appearance: She is well-developed.   Eyes:      Pupils: Pupils are equal, round, and reactive to light.   Neck:      Thyroid: No thyromegaly.      Vascular: No JVD.      Trachea: No tracheal deviation.   Cardiovascular:      Rate and Rhythm: Normal rate and regular rhythm.      Heart sounds: No murmur heard.     Pulmonary:      Effort: Pulmonary effort is normal. No respiratory distress.      Breath sounds: Normal breath sounds. No wheezing. "   Abdominal:      General: Bowel sounds are normal.      Palpations: Abdomen is soft.   Musculoskeletal:      Cervical back: Normal range of motion and neck supple.   Lymphadenopathy:      Cervical: No cervical adenopathy.   Neurological:      General: No focal deficit present.      Mental Status: She is alert and oriented to person, place, and time. Mental status is at baseline.      Cranial Nerves: No cranial nerve deficit.      Motor: No weakness.      Coordination: Coordination normal.      Gait: Gait normal.      Deep Tendon Reflexes: Reflexes normal.   Psychiatric:         Mood and Affect: Mood normal.         Behavior: Behavior normal.           Assessment/Plan   Diagnoses and all orders for this visit:    1. Hashimoto's disease (Primary)  -     Comprehensive Metabolic Panel  -     CBC & Differential  -     TSH  -     T4, Free  -     Butalbital-APAP-Caff-Cod -06-30 MG capsule; Take 1 tablet by mouth 3 (Three) Times a Day As Needed (headache).  Dispense: 30 capsule; Refill: 0    2. Hypothyroidism, unspecified type  -     Comprehensive Metabolic Panel  -     CBC & Differential  -     TSH  -     T4, Free  -     Butalbital-APAP-Caff-Cod -54-30 MG capsule; Take 1 tablet by mouth 3 (Three) Times a Day As Needed (headache).  Dispense: 30 capsule; Refill: 0    3. Intractable headache, unspecified chronicity pattern, unspecified headache type  -     Comprehensive Metabolic Panel  -     CBC & Differential  -     TSH  -     T4, Free  -     Butalbital-APAP-Caff-Cod -83-30 MG capsule; Take 1 tablet by mouth 3 (Three) Times a Day As Needed (headache).  Dispense: 30 capsule; Refill: 0  -     Ambulatory Referral to Neurology    Other orders  -     SUMAtriptan (Imitrex) 50 MG tablet; Take one tablet at onset of headache. May repeat dose one time in 2 hours if headache not relieved.  Dispense: 10 tablet; Refill: 3  -     promethazine (PHENERGAN) 50 MG tablet; Take 0.5 tablets by mouth Every 6 (Six) Hours As  Needed for Nausea or Vomiting.  Dispense: 12 tablet; Refill: 1    . I have discussed with her she had CT angiogram MRI done last year. Nothing to suggest to repeat the scans swirly. On exam she had no focal neuro deficits. Patient also agrees does not need any repeat scans at this time. Possible this could be cluster headache or continuation of migraine headaches. We will try Imitrex, I have also given her Fioricet. Phenergan for nausea vomiting. I have asked her to hydrate herself, if she starts with a headache get some fresh air. I have referred her to neurologist. Continue Synthroid for now. Awaiting labs. She will return back in 3 months time.

## 2021-10-26 ENCOUNTER — OFFICE VISIT (OUTPATIENT)
Dept: NEUROLOGY | Facility: CLINIC | Age: 36
End: 2021-10-26

## 2021-10-26 VITALS
HEIGHT: 69 IN | HEART RATE: 94 BPM | OXYGEN SATURATION: 98 % | WEIGHT: 201 LBS | DIASTOLIC BLOOD PRESSURE: 76 MMHG | BODY MASS INDEX: 29.77 KG/M2 | SYSTOLIC BLOOD PRESSURE: 116 MMHG

## 2021-10-26 DIAGNOSIS — G43.019 INTRACTABLE MIGRAINE WITHOUT AURA AND WITHOUT STATUS MIGRAINOSUS: Primary | ICD-10-CM

## 2021-10-26 PROCEDURE — 99204 OFFICE O/P NEW MOD 45 MIN: CPT | Performed by: NURSE PRACTITIONER

## 2021-10-26 RX ORDER — TOPIRAMATE 25 MG/1
50 TABLET ORAL NIGHTLY
Qty: 60 TABLET | Refills: 2 | Status: SHIPPED | OUTPATIENT
Start: 2021-10-26 | End: 2022-01-06 | Stop reason: SDUPTHER

## 2021-10-26 RX ORDER — DIPHENOXYLATE HYDROCHLORIDE AND ATROPINE SULFATE 2.5; .025 MG/1; MG/1
TABLET ORAL DAILY
COMMUNITY

## 2021-10-26 NOTE — PROGRESS NOTES
"Subjective:     Patient ID: Corby Huber is a 35 y.o. female presenting for evaluation for headaches.  She states that she has had occasional headaches throughout her life but starting about 2 months ago she began having daily headaches.  She says she wakes up with them and goes to bed with them.  They are affecting her ability to sleep well.  Pain is bitemporal and radiates bifrontal.  She describes the pain as throbbing and when the pain is severe it is accompanied by photophobia, phonophobia, nausea.  She reports occasional ringing in her ears.  She denies any vision changes but says that when the pain is severe she feels her vision is blurred.  Denies dizziness.  She wears reading glasses and sees an ophthalmologist but says that she has not been seen in over a year.  She had an MRI of her brain over a year ago that was normal.  She was taken to [facility].  She says she was having \"strokelike\" symptoms.  Her mother is with her today and says it was a panic attack.  Patient does report feeling stressed.  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  She says she sits at a computer for the majority of the day.  When she does take breaks she is often looking at her phone according to her mother.  She also reports a 25 pound weight gain over the past 4 months.  She says it is not unusual for her weight to go up and down like this.  She does not drink caffeine.  She does report difficulty sleeping and says that last night she only got 3 hours of sleep.  She was given a prescription for sumatriptan but did not feel that it helped so she stopped taking it.  She was also given a prescription for Fioricet but says this made her feel very tired so she only took 1 tablet and has not taken any more.  She has a history of hypothyroidism.  Most recent thyroid labs were normal.    History of Present Illness  The following portions of the patient's history were reviewed and updated as appropriate: allergies, current " medications, past family history, past medical history, past social history, past surgical history and problem list.    Review of Systems   Constitutional: Positive for unexpected weight change. Negative for chills and fever.   HENT: Positive for sinus pressure. Negative for tinnitus and trouble swallowing.    Eyes: Positive for photophobia (sometimes) and visual disturbance (sometimes). Negative for redness.   Respiratory: Negative for cough, shortness of breath and wheezing.    Cardiovascular: Negative for chest pain, palpitations and leg swelling.   Gastrointestinal: Positive for nausea (sometimes). Negative for diarrhea and vomiting.   Endocrine: Negative for cold intolerance, heat intolerance and polydipsia.   Genitourinary: Positive for frequency and urgency. Negative for difficulty urinating.   Musculoskeletal: Negative for back pain, neck pain and neck stiffness.   Skin: Negative for color change, rash and wound.   Allergic/Immunologic: Negative for environmental allergies, food allergies and immunocompromised state.   Neurological: Positive for headaches. Negative for dizziness, tremors, seizures, syncope, facial asymmetry, speech difficulty, weakness, light-headedness and numbness.   Hematological: Negative for adenopathy. Does not bruise/bleed easily.   Psychiatric/Behavioral: Negative for confusion and sleep disturbance. The patient is not hyperactive.         Objective:    Neurologic Exam  General: Well nourished, well developed, and in no acute distress.  HEENT: Normocephalic/atraumatic. Mucous membranes moist. Sclerae anicteric.   Heart: Regular rate and rhythm. No murmurs, rubs or gallops.  Lungs: Clear to auscultation bilaterally.  Skin: No notable rashes or lesions on the visible surfaces.   Extremities: No clubbing, cyanosis or significant edema.   Psychiatric: Pleasant, cooperative, and appropriate.   Neurologic Exam:  Mental Status:  Alert and oriented. Speech is fluent. Comprehension is intact.    Cranial Nerves II-XII: Pupils equal, round, reactive to light. Extraocular movements are full and conjugate in all directions. Pursuit movements do not provoke any apparent dizziness or discomfort.  No nystagmus noted.  Hearing is intact to voice. Facial strength and sensation are preserved and symmetric. Tongue and palate midline. Voice non-hoarse, non-dysarthric.   Motor: Normal bulk and tone of bilateral upper and lower extremities. Strength is 5/5 in all 4 extremities both proximally and distally. There are no abnormal or involuntary movements noted.  Sensation: Intact to light touch throughout. Romberg was negative with no significant sway.   Coordination: Fully intact. Finger-to-nose performed accurately bilaterally.  Reflexes: The deep tendon reflexes are 2+/4 in bilateral biceps, brachioradialis, triceps, patella, and Achilles.  No pathologic reflexes were noted.  Gait: Normal. No ataxia or apraxia.         Physical Exam    Assessment/Plan:     Diagnoses and all orders for this visit:    1. Intractable migraine without aura and without status migrainosus (Primary)  -     MRI Brain With & Without Contrast; Future    Other orders  -     topiramate (Topamax) 25 MG tablet; Take 2 tablets by mouth Every Night.  Dispense: 60 tablet; Refill: 2        The patient's headache description at this time sounds most consistent with migraine headaches.  She has not had any brain imaging since the headaches became more frequent so I ordered an MRI of her brain.  I also recommended starting topiramate given that she is having almost daily headaches at this time.  She will start 25 mg nightly and then after 2 weeks will increase to 50 mg nightly.  Risk, benefits, and side effects were discussed.  I also recommended that she keep a headache diary to help identify any potential triggers that she could possibly modify.  I encouraged good sleep hygiene, healthy diet, exercise, and stress management.  I gave her samples of  Nurtec and Ubrelvy to try as needed.  Instructions given and side effects were discussed.  If either of these worked well she will call for a prescription.  She may follow-up in about 10 weeks, and is to call the meantime with any problems.

## 2021-11-29 ENCOUNTER — HOSPITAL ENCOUNTER (OUTPATIENT)
Dept: MRI IMAGING | Facility: HOSPITAL | Age: 36
Discharge: HOME | End: 2021-11-29
Admitting: NURSE PRACTITIONER

## 2021-11-29 DIAGNOSIS — G43.019 INTRACTABLE MIGRAINE WITHOUT AURA AND WITHOUT STATUS MIGRAINOSUS: ICD-10-CM

## 2021-11-29 PROCEDURE — A9577 INJ MULTIHANCE: HCPCS | Performed by: NURSE PRACTITIONER

## 2021-11-29 PROCEDURE — 70553 MRI BRAIN STEM W/O & W/DYE: CPT

## 2021-11-29 PROCEDURE — 0 GADOBENATE DIMEGLUMINE 529 MG/ML SOLUTION: Performed by: NURSE PRACTITIONER

## 2021-11-29 RX ADMIN — GADOBENATE DIMEGLUMINE 20 ML: 529 INJECTION, SOLUTION INTRAVENOUS at 18:53

## 2022-01-06 ENCOUNTER — TELEPHONE (OUTPATIENT)
Dept: NEUROLOGY | Facility: CLINIC | Age: 37
End: 2022-01-06

## 2022-01-06 ENCOUNTER — TELEMEDICINE (OUTPATIENT)
Dept: NEUROLOGY | Facility: CLINIC | Age: 37
End: 2022-01-06

## 2022-01-06 DIAGNOSIS — G43.019 INTRACTABLE MIGRAINE WITHOUT AURA AND WITHOUT STATUS MIGRAINOSUS: Primary | ICD-10-CM

## 2022-01-06 PROCEDURE — 99213 OFFICE O/P EST LOW 20 MIN: CPT | Performed by: NURSE PRACTITIONER

## 2022-01-06 RX ORDER — TOPIRAMATE 25 MG/1
50 TABLET ORAL NIGHTLY
Qty: 60 TABLET | Refills: 5 | Status: SHIPPED | OUTPATIENT
Start: 2022-01-06 | End: 2023-01-06

## 2022-01-06 NOTE — TELEPHONE ENCOUNTER
Caller: Corby Huber    Relationship: Self    Best call back number: [phone]    What was the call regarding: PT HAS 1:30 PM APPT TODAY AND IS WANTING TO KNOW IF IT CAN BE CONVERTED TO VIDEO DUE TO THE WEATHER.    SENT PT EMAIL TO GET MY CHART SETUP.    Do you require a callback: PLEASE LET THE KNOW IF THIS REQUEST IS GRANTED.

## 2022-01-06 NOTE — PROGRESS NOTES
Subjective   Corby Huber is a 36 y.o. female presenting for follow up for migraine headaches. I saw the patient about 3 months ago. She has been on topiramate 50 mg twice daily since that time but she states today she has been taking it off and on depending on how she is feeling. She tried the Nurtec samples given but did not feel that they worked very well for her. She still has the Ubrelvy samples given. I ordered an MRI brain that was normal. Headaches have improved in frequency. She attributes this to the topiramate but she has also been working on controlling her stress level. She has been doing yoga and meditation.     I performed this clinical encounter by utilizing a real-time telehealth video connection between my location and the patient's location at home.  I obtained verbal consent from the patient to perform this clinical encounter utilizing video and prepared the patient by answering any questions they had about the telehealth interaction.      History of Present Illness     The following portions of the patient's history were reviewed and updated as appropriate: allergies, current medications, past family history, past medical history, past social history, past surgical history and problem list.    Review of Systems   Constitutional: Negative.    HENT: Negative.    Eyes: Negative.    Respiratory: Negative.    Cardiovascular: Negative.    Gastrointestinal: Negative.    Neurological: Positive for headache.   Hematological: Negative.    Psychiatric/Behavioral: Positive for stress.       Objective   Physical Exam  Mental status: Awake, alert, oriented, conversant.   HCF: Recent and remote memory intact. Knowledge of recent events intact.     Limited exam due to this being a video visit.     Assessment/Plan   Diagnoses and all orders for this visit:    1. Intractable migraine without aura and without status migrainosus (Primary)    Other orders  -     topiramate (Topamax) 25 MG tablet; Take 2 tablets by  mouth Every Night.  Dispense: 60 tablet; Refill: 5      Her headaches have improved, however she has not been taking the topiramate consistently. I encouraged her to make this change and to take it daily even if she does not have a headache. She is going to try the Ubrelvy samples given to her at her last visit. Shew ill let us know if this is helpful. She will continue stress management/yoga/meditation as this seems to be helping quite a bit as well. She is to call with any problems, otherwise will f/u in 6 months.

## 2022-01-19 RX ORDER — LEVOTHYROXINE SODIUM 125 MCG
TABLET ORAL
Qty: 90 TABLET | Refills: 0 | Status: SHIPPED | OUTPATIENT
Start: 2022-01-19 | End: 2022-04-24 | Stop reason: SDUPTHER

## 2022-04-21 ENCOUNTER — OFFICE VISIT (OUTPATIENT)
Dept: FAMILY MEDICINE CLINIC | Facility: CLINIC | Age: 37
End: 2022-04-21

## 2022-04-21 VITALS
WEIGHT: 210 LBS | TEMPERATURE: 97.6 F | SYSTOLIC BLOOD PRESSURE: 92 MMHG | DIASTOLIC BLOOD PRESSURE: 67 MMHG | BODY MASS INDEX: 31.83 KG/M2 | HEART RATE: 83 BPM | OXYGEN SATURATION: 100 % | HEIGHT: 68 IN | RESPIRATION RATE: 16 BRPM

## 2022-04-21 DIAGNOSIS — E55.9 VITAMIN D DEFICIENCY: ICD-10-CM

## 2022-04-21 DIAGNOSIS — E03.9 HYPOTHYROIDISM, UNSPECIFIED TYPE: Primary | ICD-10-CM

## 2022-04-21 DIAGNOSIS — R53.83 LETHARGY: ICD-10-CM

## 2022-04-21 DIAGNOSIS — E06.3 HASHIMOTO'S DISEASE: ICD-10-CM

## 2022-04-21 PROCEDURE — 99214 OFFICE O/P EST MOD 30 MIN: CPT | Performed by: INTERNAL MEDICINE

## 2022-04-21 NOTE — PROGRESS NOTES
Subjective   Corby Huber is a 36 y.o. female.     Chief Complaint   Patient presents with   • Thyroid Problem       History of Present Illness   All of her thyroid.  She also has migraine headaches.  Follows with neurologist.  Patient had recent ankle surgery she has been nonweightbearing for a month.  Not been very active for couple months.  Complains of being very tired for last several days.  Sleeping more.  Does not snore.  Mother is in room with her.  Denies any depression however mother thinks she may be depressed.  The following portions of the patient's history were reviewed and updated as appropriate: allergies, current medications, past family history, past medical history, past social history, past surgical history and problem list.    Review of Systems   Constitutional: Negative for activity change, appetite change, fatigue and fever.   Eyes: Negative for blurred vision and double vision.   Respiratory: Negative.  Negative for shortness of breath.    Cardiovascular: Negative for chest pain, palpitations and leg swelling.   Gastrointestinal: Negative.    Neurological: Negative for dizziness, syncope, light-headedness and headache.   Psychiatric/Behavioral: Negative for agitation.       No Known Allergies    Current Outpatient Medications on File Prior to Visit   Medication Sig Dispense Refill   • multivitamin (THERAGRAN) tablet tablet Take  by mouth Daily.     • Synthroid 125 MCG tablet TAKE ONE TABLET BY MOUTH EVERY MORNING BEFORE BREAKFAST 90 tablet 0   • topiramate (Topamax) 25 MG tablet Take 2 tablets by mouth Every Night. 60 tablet 5   • Butalbital-APAP-Caff-Cod -07-30 MG capsule Take 1 tablet by mouth 3 (Three) Times a Day As Needed (headache). 30 capsule 0   • diphenhydrAMINE (BENADRYL) 25 mg capsule Take 25 mg by mouth As Needed.     • Ibuprofen (ADVIL MIGRAINE PO) Take  by mouth.     • promethazine (PHENERGAN) 50 MG tablet Take 0.5 tablets by mouth Every 6 (Six) Hours As Needed for Nausea or  "Vomiting. 12 tablet 1   • SUMAtriptan (Imitrex) 50 MG tablet Take one tablet at onset of headache. May repeat dose one time in 2 hours if headache not relieved. 10 tablet 3     No current facility-administered medications on file prior to visit.       Family History   Problem Relation Age of Onset   • Liver disease Father    • Diabetes Maternal Uncle    • Heart disease Maternal Uncle    • Hypertension Maternal Uncle    • Arthritis Maternal Grandmother    • Heart disease Maternal Grandmother    • Hypertension Maternal Grandmother    • Seizures Maternal Grandfather        Past Medical History:   Diagnosis Date   • Environmental allergies    • Hypothyroidism        History reviewed. No pertinent surgical history.    Social History     Socioeconomic History   • Marital status: Single   Tobacco Use   • Smoking status: Never Smoker   • Smokeless tobacco: Never Used   Vaping Use   • Vaping Use: Never used   Substance and Sexual Activity   • Alcohol use: Never   • Drug use: Never   • Sexual activity: Defer       Patient Active Problem List   Diagnosis   • Ankle injury, left, initial encounter   • Benign neoplasm of cerebral meninges (HCC)   • Lumbar disc herniation with radiculopathy   • Lumbar spondylosis with myelopathy   • Hashimoto's disease   • Hypothyroidism   • Intractable migraine without aura and without status migrainosus       BP 92/67 (BP Location: Left arm, Patient Position: Sitting, Cuff Size: Large Adult)   Pulse 83   Temp 97.6 °F (36.4 °C)   Resp 16   Ht 172.7 cm (68\")   Wt 95.3 kg (210 lb)   SpO2 100%   BMI 31.93 kg/m²   Body mass index is 31.93 kg/m².    Objective   Physical Exam  Vitals and nursing note reviewed.   Constitutional:       Appearance: She is well-developed.   Eyes:      Pupils: Pupils are equal, round, and reactive to light.   Neck:      Thyroid: No thyromegaly.      Vascular: No JVD.      Trachea: No tracheal deviation.   Cardiovascular:      Rate and Rhythm: Normal rate and regular " rhythm.      Heart sounds: No murmur heard.  Pulmonary:      Effort: Pulmonary effort is normal. No respiratory distress.      Breath sounds: Normal breath sounds. No wheezing.   Abdominal:      General: Bowel sounds are normal. There is no distension.      Palpations: Abdomen is soft. There is no mass.      Tenderness: There is no abdominal tenderness. There is no right CVA tenderness, left CVA tenderness, guarding or rebound.      Hernia: No hernia is present.   Musculoskeletal:      Cervical back: Normal range of motion and neck supple.   Lymphadenopathy:      Cervical: No cervical adenopathy.   Neurological:      General: No focal deficit present.      Mental Status: She is alert and oriented to person, place, and time. Mental status is at baseline.   Psychiatric:         Mood and Affect: Mood normal.         Behavior: Behavior normal.           Assessment/Plan   Diagnoses and all orders for this visit:    1. Hypothyroidism, unspecified type (Primary)  -     CBC & Differential  -     Comprehensive Metabolic Panel  -     TSH  -     T4, Free  -     Vitamin B12  -     Folate  -     Lipid Panel With / Chol / HDL Ratio  -     Vitamin D 25 hydroxy  -     Thyroid Peroxidase Antibody    2. Hashimoto's disease  -     CBC & Differential  -     Comprehensive Metabolic Panel  -     TSH  -     T4, Free  -     Vitamin B12  -     Folate  -     Lipid Panel With / Chol / HDL Ratio  -     Vitamin D 25 hydroxy  -     Thyroid Peroxidase Antibody    3. Vitamin D deficiency  -     CBC & Differential  -     Comprehensive Metabolic Panel  -     TSH  -     T4, Free  -     Vitamin B12  -     Folate  -     Lipid Panel With / Chol / HDL Ratio  -     Vitamin D 25 hydroxy    4. Lethargy  -     CBC & Differential  -     Comprehensive Metabolic Panel  -     TSH  -     T4, Free  -     Vitamin B12  -     Folate  -     Lipid Panel With / Chol / HDL Ratio  -     Vitamin D 25 hydroxy    Continue diet and exercise.  Continue taking Synthroid,  Topamax.  Patient also on something else for her migraine headaches does not remember the name.  Awaiting labs.  Patient to hydrate herself.  I have discussed with her and mother if they decide to take antidepressant I can prescribe her Lexapro or Effexor.  Continue all current medication.  I will see her back in 3 months time.  EHR dragon/transcription disclaimer:  Part of this note are created by electronic transcription/translation of spoken language to printed text and thus may lead to erroneous, or at times, nonsensical words or phrases inadvertently transcribed.  Although I have reviewed for such errors, some may still exist.         Answers for HPI/ROS submitted by the patient on 4/19/2022  Please describe your symptoms.: Lathargic and tired, body heaviness/sore  Have you had these symptoms before?: No  How long have you been having these symptoms?: 1-4 days  What is the primary reason for your visit?: Other

## 2022-04-22 LAB
25(OH)D3+25(OH)D2 SERPL-MCNC: 26.5 NG/ML (ref 30–100)
ALBUMIN SERPL-MCNC: 4.7 G/DL (ref 3.8–4.8)
ALBUMIN/GLOB SERPL: 1.7 {RATIO} (ref 1.2–2.2)
ALP SERPL-CCNC: 97 IU/L (ref 44–121)
ALT SERPL-CCNC: 16 IU/L (ref 0–32)
AST SERPL-CCNC: 16 IU/L (ref 0–40)
BASOPHILS # BLD AUTO: 0 X10E3/UL (ref 0–0.2)
BASOPHILS NFR BLD AUTO: 0 %
BILIRUB SERPL-MCNC: <0.2 MG/DL (ref 0–1.2)
BUN SERPL-MCNC: 12 MG/DL (ref 6–20)
BUN/CREAT SERPL: 16 (ref 9–23)
CALCIUM SERPL-MCNC: 9.7 MG/DL (ref 8.7–10.2)
CHLORIDE SERPL-SCNC: 103 MMOL/L (ref 96–106)
CHOLEST SERPL-MCNC: 197 MG/DL (ref 100–199)
CHOLEST/HDLC SERPL: 4 RATIO (ref 0–4.4)
CO2 SERPL-SCNC: 21 MMOL/L (ref 20–29)
CREAT SERPL-MCNC: 0.75 MG/DL (ref 0.57–1)
EGFRCR SERPLBLD CKD-EPI 2021: 105 ML/MIN/1.73
EOSINOPHIL # BLD AUTO: 0 X10E3/UL (ref 0–0.4)
EOSINOPHIL NFR BLD AUTO: 0 %
ERYTHROCYTE [DISTWIDTH] IN BLOOD BY AUTOMATED COUNT: 12.6 % (ref 11.7–15.4)
FOLATE SERPL-MCNC: 15.6 NG/ML
GLOBULIN SER CALC-MCNC: 2.7 G/DL (ref 1.5–4.5)
GLUCOSE SERPL-MCNC: 99 MG/DL (ref 65–99)
HCT VFR BLD AUTO: 43.8 % (ref 34–46.6)
HDLC SERPL-MCNC: 49 MG/DL
HGB BLD-MCNC: 14.2 G/DL (ref 11.1–15.9)
IMM GRANULOCYTES # BLD AUTO: 0 X10E3/UL (ref 0–0.1)
IMM GRANULOCYTES NFR BLD AUTO: 0 %
LDLC SERPL CALC-MCNC: 115 MG/DL (ref 0–99)
LYMPHOCYTES # BLD AUTO: 1.7 X10E3/UL (ref 0.7–3.1)
LYMPHOCYTES NFR BLD AUTO: 31 %
MCH RBC QN AUTO: 29.4 PG (ref 26.6–33)
MCHC RBC AUTO-ENTMCNC: 32.4 G/DL (ref 31.5–35.7)
MCV RBC AUTO: 91 FL (ref 79–97)
MONOCYTES # BLD AUTO: 0.5 X10E3/UL (ref 0.1–0.9)
MONOCYTES NFR BLD AUTO: 9 %
NEUTROPHILS # BLD AUTO: 3.2 X10E3/UL (ref 1.4–7)
NEUTROPHILS NFR BLD AUTO: 60 %
PLATELET # BLD AUTO: 224 X10E3/UL (ref 150–450)
POTASSIUM SERPL-SCNC: 4.7 MMOL/L (ref 3.5–5.2)
PROT SERPL-MCNC: 7.4 G/DL (ref 6–8.5)
RBC # BLD AUTO: 4.83 X10E6/UL (ref 3.77–5.28)
SODIUM SERPL-SCNC: 140 MMOL/L (ref 134–144)
T4 FREE SERPL-MCNC: 0.98 NG/DL (ref 0.82–1.77)
THYROPEROXIDASE AB SERPL-ACNC: 175 IU/ML (ref 0–34)
TRIGL SERPL-MCNC: 187 MG/DL (ref 0–149)
TSH SERPL DL<=0.005 MIU/L-ACNC: 5.19 UIU/ML (ref 0.45–4.5)
VIT B12 SERPL-MCNC: 649 PG/ML (ref 232–1245)
VLDLC SERPL CALC-MCNC: 33 MG/DL (ref 5–40)
WBC # BLD AUTO: 5.4 X10E3/UL (ref 3.4–10.8)

## 2022-04-24 RX ORDER — LEVOTHYROXINE SODIUM 137 UG/1
137 TABLET ORAL DAILY
Qty: 90 TABLET | Refills: 3 | Status: SHIPPED | OUTPATIENT
Start: 2022-04-24 | End: 2022-10-18

## 2022-07-05 ENCOUNTER — OFFICE VISIT (OUTPATIENT)
Dept: NEUROLOGY | Facility: CLINIC | Age: 37
End: 2022-07-05

## 2022-07-05 VITALS
HEART RATE: 80 BPM | BODY MASS INDEX: 31.52 KG/M2 | DIASTOLIC BLOOD PRESSURE: 70 MMHG | OXYGEN SATURATION: 99 % | SYSTOLIC BLOOD PRESSURE: 122 MMHG | HEIGHT: 68 IN | WEIGHT: 208 LBS

## 2022-07-05 DIAGNOSIS — G43.019 INTRACTABLE MIGRAINE WITHOUT AURA AND WITHOUT STATUS MIGRAINOSUS: Primary | ICD-10-CM

## 2022-07-05 PROCEDURE — 99213 OFFICE O/P EST LOW 20 MIN: CPT | Performed by: NURSE PRACTITIONER

## 2022-07-05 RX ORDER — RIMEGEPANT SULFATE 75 MG/75MG
1 TABLET, ORALLY DISINTEGRATING ORAL DAILY PRN
Qty: 16 TABLET | Refills: 5 | Status: SHIPPED | OUTPATIENT
Start: 2022-07-05 | End: 2023-01-12 | Stop reason: SDUPTHER

## 2022-07-05 RX ORDER — GALCANEZUMAB 120 MG/ML
120 INJECTION, SOLUTION SUBCUTANEOUS
Qty: 1 ML | Refills: 2 | Status: SHIPPED | OUTPATIENT
Start: 2022-07-05 | End: 2022-10-06

## 2022-07-05 RX ORDER — RIMEGEPANT SULFATE 75 MG/75MG
TABLET, ORALLY DISINTEGRATING ORAL
COMMUNITY
End: 2022-07-05 | Stop reason: SDUPTHER

## 2022-07-05 NOTE — PROGRESS NOTES
Subjective:     Patient ID: Corby Huber is a 36 y.o. female presenting for follow up for migraine headaches. She is taking topiramate 50 mg nightly. This does work well for her but she often forgets to take the medication and has breakthrough migraines due to this. She is using Nurtec as needed. This initially caused quite a bit of fatigue but she states it is now working well for her.     History of Present Illness  The following portions of the patient's history were reviewed and updated as appropriate: allergies, current medications, past family history, past medical history, past social history, past surgical history and problem list.    Review of Systems   Eyes: Negative for photophobia, redness and visual disturbance.   Gastrointestinal: Positive for nausea. Negative for diarrhea and vomiting.   Musculoskeletal: Negative for back pain, neck pain and neck stiffness.   Neurological: Positive for dizziness and headaches. Negative for tremors, seizures, syncope, facial asymmetry, speech difficulty, weakness, light-headedness and numbness.   Hematological: Negative for adenopathy. Does not bruise/bleed easily.      I have reviewed and confirmed the accuracy of the patient's history: Chief complaint, HPI, ROS, Subjective and Past Family Social History as entered by the MA/LPN/RN. Baron Kim Alexis, APRN 07/05/22     Objective:    Neurologic Exam  General: Well nourished, well developed, and in no acute distress.  HEENT: Normocephalic/atraumatic. Mucous membranes moist. Sclerae anicteric.   Heart: Regular rate and rhythm. No murmurs, rubs or gallops.  Lungs: Clear to auscultation bilaterally.  Skin: No notable rashes or lesions on the visible surfaces.   Extremities: No clubbing, cyanosis or significant edema.   Psychiatric: Pleasant, cooperative, and appropriate.   Neurologic Exam:  Mental Status:  Alert and oriented. Speech is fluent. Comprehension is intact.   Cranial Nerves II-XII: Pupils equal, round, reactive  to light. Extraocular movements are full and conjugate in all directions. Pursuit movements do not provoke any apparent dizziness or discomfort.  No nystagmus noted.  Hearing is intact to voice. Facial strength and sensation are preserved and symmetric. Tongue and palate midline. Voice non-hoarse, non-dysarthric.   Motor: Normal bulk and tone of bilateral upper and lower extremities. Strength is 5/5 in all 4 extremities both proximally and distally. There are no abnormal or involuntary movements noted.  Sensation: Intact to light touch throughout. Romberg was negative with no significant sway.   Coordination: Fully intact. Finger-to-nose performed accurately bilaterally.  Reflexes: The deep tendon reflexes are 2+/4 in bilateral biceps, brachioradialis, triceps, patella, and Achilles.  No pathologic reflexes were noted.  Gait: Normal. No ataxia or apraxia.         Physical Exam    Assessment/Plan:     Diagnoses and all orders for this visit:    1. Intractable migraine without aura and without status migrainosus (Primary)    Other orders  -     Rimegepant Sulfate (Nurtec) 75 MG tablet dispersible tablet; Take 1 tablet by mouth Daily As Needed (migraine).  Dispense: 16 tablet; Refill: 5  -     galcanezumab-gnlm (Emgality) 120 MG/ML auto-injector pen; Inject 1 mL under the skin into the appropriate area as directed Every 30 (Thirty) Days.  Dispense: 1 mL; Refill: 2        Since she often forgets to take the topiramate and is having multiple breakthrough migraines per month, I recommended trying one of the once monthly CGRP options. She agrees with this and will try Emgality. Loading dose sample given today. Instructions given. SE reviewed. She will reduce the topiramate down to 25 mg daily for a couple of weeks and then stop taking it. She will continue Nurtec as needed. This is another preventative option for her as well but since she is forgetting the daily topiramate I feel that every other day dosing on the Nurtec  is not the best option for her. She will call with any problems. If the Emgality works well for her she can follow up in 6 months, otherwise I will see her back sooner.

## 2022-09-08 ENCOUNTER — TELEPHONE (OUTPATIENT)
Dept: NEUROLOGY | Facility: CLINIC | Age: 37
End: 2022-09-08

## 2022-09-08 NOTE — TELEPHONE ENCOUNTER
Provider: ONESIMO PULIDO  Caller: EUGENIO DAVIDSON  Relationship to Patient: SELF  Pharmacy: [facility]    Reason for Call: PT HAS BEEN TRYING TO GET HER [facility] AND THEY ARE OUT [facility] IT AND HAS NOT GOTTEN ANY IN. HER SHOT WAS DUE ON THE 9-04-22 AND SHE IS DEALING WITH A MIGRAINE. SHE IS WANTING TO KNOW IF SHE CAN GET A SAMPLE OF THE EMGAILITY?    PLEASE CALL HER BACK [phone]

## 2022-09-15 ENCOUNTER — TELEPHONE (OUTPATIENT)
Dept: NEUROLOGY | Facility: CLINIC | Age: 37
End: 2022-09-15

## 2022-09-15 NOTE — TELEPHONE ENCOUNTER
PATIENT CALLING TO ADVISE THAT INSURANCE HAS DENIED EMGALITY - THE INSURANCE COMPANY IS ASKING FOR ADDITIONAL INFORMATION.  PLEASE CONTACT INSURANCE AT  [phone]    THANK YOU

## 2022-10-06 RX ORDER — ERENUMAB-AOOE 70 MG/ML
70 INJECTION SUBCUTANEOUS
Qty: 1 ML | Refills: 2 | Status: SHIPPED | OUTPATIENT
Start: 2022-10-06 | End: 2023-01-05

## 2022-10-17 ENCOUNTER — OFFICE VISIT (OUTPATIENT)
Dept: FAMILY MEDICINE CLINIC | Facility: CLINIC | Age: 37
End: 2022-10-17

## 2022-10-17 VITALS
WEIGHT: 212 LBS | TEMPERATURE: 98.9 F | OXYGEN SATURATION: 100 % | BODY MASS INDEX: 32.13 KG/M2 | DIASTOLIC BLOOD PRESSURE: 68 MMHG | HEIGHT: 68 IN | HEART RATE: 58 BPM | RESPIRATION RATE: 16 BRPM | SYSTOLIC BLOOD PRESSURE: 102 MMHG

## 2022-10-17 DIAGNOSIS — E06.3 HASHIMOTO'S DISEASE: ICD-10-CM

## 2022-10-17 DIAGNOSIS — E55.9 VITAMIN D DEFICIENCY: ICD-10-CM

## 2022-10-17 DIAGNOSIS — E06.3 HASHIMOTO'S DISEASE: Primary | ICD-10-CM

## 2022-10-17 DIAGNOSIS — E03.9 HYPOTHYROIDISM, UNSPECIFIED TYPE: ICD-10-CM

## 2022-10-17 PROBLEM — G43.C0 PERIODIC HEADACHE SYNDROME, NOT INTRACTABLE: Status: ACTIVE | Noted: 2022-10-17

## 2022-10-17 PROCEDURE — 99213 OFFICE O/P EST LOW 20 MIN: CPT | Performed by: INTERNAL MEDICINE

## 2022-10-17 NOTE — PROGRESS NOTES
Subjective   Corby Huber is a 36 y.o. female.     Chief Complaint   Patient presents with   • Hypothyroidism   Migraine headaches, vitamin D deficiency, mild hyperlipidemia    History of Present Illness   Patient seen follow-up today for hypothyroidism, migraine headaches, vitamin D deficiency, mild hyperlipidemia.  Denies any chest pain shortness of breath.  Synthroid was increased last time.  Currently on amovig, Topamax for migraine headaches.  She is also on Nurtec.  The following portions of the patient's history were reviewed and updated as appropriate: allergies, current medications, past family history, past medical history, past social history, past surgical history and problem list.    Review of Systems   Constitutional: Negative for activity change, appetite change, fatigue and fever.   Eyes: Negative for blurred vision and double vision.   Respiratory: Negative.  Negative for shortness of breath.    Cardiovascular: Negative for chest pain, palpitations and leg swelling.   Gastrointestinal: Negative.    Musculoskeletal: Negative for arthralgias.   Neurological: Negative for dizziness, syncope, light-headedness and headache.   Psychiatric/Behavioral: Negative for agitation and behavioral problems.       No Known Allergies    Current Outpatient Medications on File Prior to Visit   Medication Sig Dispense Refill   • Butalbital-APAP-Caff-Cod -29-30 MG capsule Take 1 tablet by mouth 3 (Three) Times a Day As Needed (headache). 30 capsule 0   • diphenhydrAMINE (BENADRYL) 25 mg capsule Take 25 mg by mouth As Needed.     • Erenumab-aooe (Aimovig) 70 MG/ML prefilled syringe Inject 1 mL under the skin into the appropriate area as directed Every 30 (Thirty) Days. 1 mL 2   • Ibuprofen (ADVIL MIGRAINE PO) Take  by mouth.     • levothyroxine (Synthroid) 137 MCG tablet Take 1 tablet by mouth Daily. 90 tablet 3   • multivitamin (THERAGRAN) tablet tablet Take  by mouth Daily.     • promethazine (PHENERGAN) 50 MG  "tablet Take 0.5 tablets by mouth Every 6 (Six) Hours As Needed for Nausea or Vomiting. 12 tablet 1   • Rimegepant Sulfate (Nurtec) 75 MG tablet dispersible tablet Take 1 tablet by mouth Daily As Needed (migraine). 16 tablet 5   • SUMAtriptan (Imitrex) 50 MG tablet Take one tablet at onset of headache. May repeat dose one time in 2 hours if headache not relieved. 10 tablet 3   • topiramate (Topamax) 25 MG tablet Take 2 tablets by mouth Every Night. 60 tablet 5     No current facility-administered medications on file prior to visit.       Family History   Problem Relation Age of Onset   • Liver disease Father    • Diabetes Maternal Uncle    • Heart disease Maternal Uncle    • Hypertension Maternal Uncle    • Arthritis Maternal Grandmother    • Heart disease Maternal Grandmother    • Hypertension Maternal Grandmother    • Seizures Maternal Grandfather        Past Medical History:   Diagnosis Date   • Environmental allergies    • Hypothyroidism        Past Surgical History:   Procedure Laterality Date   • FOOT SURGERY N/A 01/2022       Social History     Socioeconomic History   • Marital status: Single   Tobacco Use   • Smoking status: Never   • Smokeless tobacco: Never   Vaping Use   • Vaping Use: Never used   Substance and Sexual Activity   • Alcohol use: Never   • Drug use: Never   • Sexual activity: Defer       Patient Active Problem List   Diagnosis   • Ankle injury, left, initial encounter   • Benign neoplasm of cerebral meninges (HCC)   • Lumbar disc herniation with radiculopathy   • Lumbar spondylosis with myelopathy   • Hashimoto's disease   • Hypothyroidism   • Intractable migraine without aura and without status migrainosus   • Vitamin D deficiency   • Periodic headache syndrome, not intractable       /68 (BP Location: Left arm, Patient Position: Sitting, Cuff Size: Large Adult)   Pulse 58   Temp 98.9 °F (37.2 °C)   Resp 16   Ht 172.7 cm (68\")   Wt 96.2 kg (212 lb)   SpO2 100%   BMI 32.23 kg/m² "   Body mass index is 32.23 kg/m².    Objective   Physical Exam  Vitals and nursing note reviewed.   Constitutional:       Appearance: She is well-developed.   Eyes:      Pupils: Pupils are equal, round, and reactive to light.   Neck:      Thyroid: No thyromegaly.      Vascular: No JVD.      Trachea: No tracheal deviation.   Cardiovascular:      Rate and Rhythm: Normal rate and regular rhythm.      Heart sounds: No murmur heard.  Pulmonary:      Effort: Pulmonary effort is normal. No respiratory distress.      Breath sounds: Normal breath sounds. No wheezing.   Abdominal:      General: Bowel sounds are normal. There is no distension.      Palpations: Abdomen is soft. There is no mass.      Tenderness: There is no abdominal tenderness. There is no left CVA tenderness, guarding or rebound.      Hernia: No hernia is present.   Musculoskeletal:      Cervical back: Normal range of motion and neck supple.   Lymphadenopathy:      Cervical: No cervical adenopathy.   Neurological:      General: No focal deficit present.      Mental Status: She is alert and oriented to person, place, and time.   Psychiatric:         Mood and Affect: Mood normal.           Assessment & Plan   Diagnoses and all orders for this visit:    1. Hashimoto's disease (Primary)  -     Comprehensive metabolic panel; Future  -     Lipid Panel With LDL/HDL Ratio; Future  -     T4, free; Future  -     TSH; Future  -     Vitamin D 25 hydroxy; Future    2. Hypothyroidism, unspecified type  -     Comprehensive metabolic panel; Future  -     Lipid Panel With LDL/HDL Ratio; Future  -     T4, free; Future  -     TSH; Future  -     Vitamin D 25 hydroxy; Future    3. Vitamin D deficiency  -     Comprehensive metabolic panel; Future  -     Lipid Panel With LDL/HDL Ratio; Future  -     T4, free; Future  -     TSH; Future  -     Vitamin D 25 hydroxy; Future    Continue diet and exercise.  Continue all current medications.  Check blood pressure at home.  Lose weight.   Diet and exercise.  Continue over-the-counter vitamin D, Synthroid 1037 mics daily.  To new migraine medications.  Return in 3 months time.  EHR dragon/transcription disclaimer:  Part of this note are created by electronic transcription/translation of spoken language to printed text and thus may lead to erroneous, or at times, nonsensical words or phrases inadvertently transcribed.  Although I have reviewed for such errors, some may still exist.

## 2022-10-18 DIAGNOSIS — R74.01 TRANSAMINITIS: Primary | ICD-10-CM

## 2022-10-18 LAB
25(OH)D3+25(OH)D2 SERPL-MCNC: 23.6 NG/ML (ref 30–100)
ALBUMIN SERPL-MCNC: 4.9 G/DL (ref 3.5–5.2)
ALBUMIN/GLOB SERPL: 2.2 G/DL
ALP SERPL-CCNC: 101 U/L (ref 39–117)
ALT SERPL-CCNC: 35 U/L (ref 1–33)
AST SERPL-CCNC: 33 U/L (ref 1–32)
BILIRUB SERPL-MCNC: 0.4 MG/DL (ref 0–1.2)
BUN SERPL-MCNC: 10 MG/DL (ref 6–20)
BUN/CREAT SERPL: 14.9 (ref 7–25)
CALCIUM SERPL-MCNC: 9.2 MG/DL (ref 8.6–10.5)
CHLORIDE SERPL-SCNC: 100 MMOL/L (ref 98–107)
CHOLEST SERPL-MCNC: 220 MG/DL (ref 0–200)
CO2 SERPL-SCNC: 26.2 MMOL/L (ref 22–29)
CREAT SERPL-MCNC: 0.67 MG/DL (ref 0.57–1)
EGFRCR SERPLBLD CKD-EPI 2021: 115.6 ML/MIN/1.73
GLOBULIN SER CALC-MCNC: 2.2 GM/DL
GLUCOSE SERPL-MCNC: 98 MG/DL (ref 65–99)
HDLC SERPL-MCNC: 48 MG/DL (ref 40–60)
LDLC SERPL CALC-MCNC: 114 MG/DL (ref 0–100)
LDLC/HDLC SERPL: 2.2 {RATIO}
POTASSIUM SERPL-SCNC: 4.2 MMOL/L (ref 3.5–5.2)
PROT SERPL-MCNC: 7.1 G/DL (ref 6–8.5)
SODIUM SERPL-SCNC: 138 MMOL/L (ref 136–145)
T4 FREE SERPL-MCNC: 0.99 NG/DL (ref 0.93–1.7)
TRIGL SERPL-MCNC: 333 MG/DL (ref 0–150)
TSH SERPL DL<=0.005 MIU/L-ACNC: 11.6 UIU/ML (ref 0.27–4.2)
VLDLC SERPL CALC-MCNC: 58 MG/DL (ref 5–40)

## 2022-10-18 RX ORDER — LEVOTHYROXINE SODIUM 0.15 MG/1
150 TABLET ORAL DAILY
Qty: 90 TABLET | Refills: 3 | Status: SHIPPED | OUTPATIENT
Start: 2022-10-18

## 2022-10-19 ENCOUNTER — TELEPHONE (OUTPATIENT)
Dept: FAMILY MEDICINE CLINIC | Facility: CLINIC | Age: 37
End: 2022-10-19

## 2022-10-19 NOTE — TELEPHONE ENCOUNTER
She wants to know if she needs additional testing for Thyroid function rather than just an increase in medicine. She is also concerned about her cholesterol and what she needs to do to lower. She is asking you give her a call.

## 2022-10-20 DIAGNOSIS — E03.9 HYPOTHYROIDISM, UNSPECIFIED TYPE: Primary | ICD-10-CM

## 2022-11-11 NOTE — TELEPHONE ENCOUNTER
Cannot get medication to send to pharmacy, as this medication is not configured to eprescribe. While attempting to change medication to another that may work, it seems any compound cannot be sent.   Will have to check with Dr Prasad on this next week.

## 2022-11-14 ENCOUNTER — OFFICE VISIT (OUTPATIENT)
Dept: FAMILY MEDICINE CLINIC | Facility: CLINIC | Age: 37
End: 2022-11-14

## 2022-11-14 VITALS
TEMPERATURE: 97.6 F | HEIGHT: 68 IN | HEART RATE: 79 BPM | SYSTOLIC BLOOD PRESSURE: 106 MMHG | WEIGHT: 210 LBS | OXYGEN SATURATION: 99 % | RESPIRATION RATE: 16 BRPM | BODY MASS INDEX: 31.83 KG/M2 | DIASTOLIC BLOOD PRESSURE: 62 MMHG

## 2022-11-14 DIAGNOSIS — E03.9 HYPOTHYROIDISM, UNSPECIFIED TYPE: ICD-10-CM

## 2022-11-14 DIAGNOSIS — N91.2 AMENORRHEA: Primary | ICD-10-CM

## 2022-11-14 PROCEDURE — 99213 OFFICE O/P EST LOW 20 MIN: CPT | Performed by: INTERNAL MEDICINE

## 2022-11-14 RX ORDER — LEVOTHYROXINE SODIUM 137 MCG
137 TABLET ORAL DAILY
COMMUNITY
Start: 2022-11-08

## 2022-11-14 NOTE — PROGRESS NOTES
Subjective   Corby Huber is a 36 y.o. female.     Chief Complaint   Patient presents with   • Amenorrhea   Hypothyroidism    History of Present Illness   Patient is followed for hypothyroidism, complains of no menses since September.  Patient is not pregnant.  Continues to feel weak.  Reports she takes brand-name Synthroid.  Did not start the higher dose of Synthroid waiting for repeat test.  She sees a nutritionist requesting several blood test.  She has tried to call her OB/GYN but unable to get appointment.  She was prescribed compounded medicine by her colorectal surgeon for few years and then not responding for the refill.  The following portions of the patient's history were reviewed and updated as appropriate: allergies, current medications, past family history, past medical history, past social history, past surgical history and problem list.    Review of Systems   Constitutional: Positive for fatigue. Negative for activity change, appetite change and fever.   Eyes: Negative for blurred vision and double vision.   Respiratory: Negative for shortness of breath.    Cardiovascular: Negative for chest pain, palpitations and leg swelling.   Genitourinary: Positive for amenorrhea.   Neurological: Negative for dizziness, syncope, light-headedness and headache.       No Known Allergies    Current Outpatient Medications on File Prior to Visit   Medication Sig Dispense Refill   • Butalbital-APAP-Caff-Cod -15-30 MG capsule Take 1 tablet by mouth 3 (Three) Times a Day As Needed (headache). 30 capsule 0   • diphenhydrAMINE (BENADRYL) 25 mg capsule Take 25 mg by mouth As Needed.     • Erenumab-aooe (Aimovig) 70 MG/ML prefilled syringe Inject 1 mL under the skin into the appropriate area as directed Every 30 (Thirty) Days. 1 mL 2   • Ibuprofen (ADVIL MIGRAINE PO) Take  by mouth.     • levothyroxine (Synthroid) 150 MCG tablet Take 1 tablet by mouth Daily. 90 tablet 3   • multivitamin (THERAGRAN) tablet tablet Take  by  mouth Daily.     • promethazine (PHENERGAN) 50 MG tablet Take 0.5 tablets by mouth Every 6 (Six) Hours As Needed for Nausea or Vomiting. 12 tablet 1   • Rimegepant Sulfate (Nurtec) 75 MG tablet dispersible tablet Take 1 tablet by mouth Daily As Needed (migraine). 16 tablet 5   • SUMAtriptan (Imitrex) 50 MG tablet Take one tablet at onset of headache. May repeat dose one time in 2 hours if headache not relieved. 10 tablet 3   • topiramate (Topamax) 25 MG tablet Take 2 tablets by mouth Every Night. 60 tablet 5   • Synthroid 137 MCG tablet      • white petrolatum-dilTIAZem Apply to area at least 3 times per day 30 g 3     No current facility-administered medications on file prior to visit.       Family History   Problem Relation Age of Onset   • Liver disease Father    • Diabetes Maternal Uncle    • Heart disease Maternal Uncle    • Hypertension Maternal Uncle    • Arthritis Maternal Grandmother    • Heart disease Maternal Grandmother    • Hypertension Maternal Grandmother    • Seizures Maternal Grandfather        Past Medical History:   Diagnosis Date   • Environmental allergies    • Hypothyroidism        Past Surgical History:   Procedure Laterality Date   • FOOT SURGERY N/A 01/2022       Social History     Socioeconomic History   • Marital status: Single   Tobacco Use   • Smoking status: Never   • Smokeless tobacco: Never   Vaping Use   • Vaping Use: Never used   Substance and Sexual Activity   • Alcohol use: Never   • Drug use: Never   • Sexual activity: Defer       Patient Active Problem List   Diagnosis   • Ankle injury, left, initial encounter   • Benign neoplasm of cerebral meninges (HCC)   • Lumbar disc herniation with radiculopathy   • Lumbar spondylosis with myelopathy   • Hashimoto's disease   • Hypothyroidism   • Intractable migraine without aura and without status migrainosus   • Vitamin D deficiency   • Periodic headache syndrome, not intractable       /62 (BP Location: Left arm, Patient Position:  "Sitting, Cuff Size: Large Adult)   Pulse 79   Temp 97.6 °F (36.4 °C)   Resp 16   Ht 172.7 cm (68\")   Wt 95.3 kg (210 lb)   SpO2 99%   BMI 31.93 kg/m²   Body mass index is 31.93 kg/m².    Objective   Physical Exam  Vitals and nursing note reviewed.   Constitutional:       Appearance: She is well-developed.   Eyes:      Pupils: Pupils are equal, round, and reactive to light.   Neck:      Thyroid: No thyromegaly.      Vascular: No JVD.      Trachea: No tracheal deviation.   Cardiovascular:      Rate and Rhythm: Normal rate and regular rhythm.      Heart sounds: No murmur heard.  Pulmonary:      Effort: Pulmonary effort is normal. No respiratory distress.      Breath sounds: Normal breath sounds. No wheezing.   Abdominal:      General: Bowel sounds are normal. There is no distension.      Palpations: Abdomen is soft.   Musculoskeletal:      Cervical back: Normal range of motion and neck supple.   Lymphadenopathy:      Cervical: No cervical adenopathy.   Neurological:      Mental Status: She is alert and oriented to person, place, and time.           Assessment & Plan   Diagnoses and all orders for this visit:    1. Amenorrhea (Primary)  -     TSH  -     FSH & LH  -     Cancel: Estrogens, Total  -     Cancel: Ferritin  -     Cancel: Leptin, Serum; Future  -     Estrogens, Total  -     Ferritin  -     Leptin, Serum    2. Hypothyroidism, unspecified type  -     TSH  -     FSH & LH  -     Cancel: Estrogens, Total  -     Cancel: Ferritin  -     Estrogens, Total  -     Ferritin    Continue diet and exercise.  Lose weight.  Discussed with patient different test that were ordered I will be able to address them if the tests are abnormal.  She is willing to increase her Synthroid but wants brand-name if TSH still off.  She has appointment with the endocrinologist not from several months.  Patient to keep on calling her OB/GYN for an appointment.  Rest of problems are chronic and stable.  I will see her back at her regular " appointment.  EHR dragon/transcription disclaimer:  Part of this note are created by electronic transcription/translation of spoken language to printed text and thus may lead to erroneous, or at times, nonsensical words or phrases inadvertently transcribed.  Although I have reviewed for such errors, some may still exist.

## 2022-11-15 DIAGNOSIS — R74.01 TRANSAMINITIS: ICD-10-CM

## 2022-11-15 DIAGNOSIS — N91.2 AMENORRHEA: Primary | ICD-10-CM

## 2022-11-16 LAB
ALBUMIN SERPL-MCNC: 5.1 G/DL (ref 3.5–5.2)
ALP SERPL-CCNC: 81 U/L (ref 39–117)
ALT SERPL-CCNC: 15 U/L (ref 1–33)
AST SERPL-CCNC: 22 U/L (ref 1–32)
BILIRUB DIRECT SERPL-MCNC: <0.2 MG/DL (ref 0–0.3)
BILIRUB SERPL-MCNC: 0.3 MG/DL (ref 0–1.2)
Lab: NORMAL
PROT SERPL-MCNC: 7.3 G/DL (ref 6–8.5)
TESTOST SERPL-MCNC: 4 NG/DL (ref 8–60)
WRITTEN AUTHORIZATION: NORMAL

## 2022-11-18 LAB
ESTROGEN SERPL-MCNC: 397 PG/ML
FERRITIN SERPL-MCNC: 49.2 NG/ML (ref 13–150)
FSH SERPL-ACNC: 8 MIU/ML
LEPTIN SERPL-MCNC: 39.6 NG/ML
LH SERPL-ACNC: 5.9 MIU/ML
TSH SERPL DL<=0.005 MIU/L-ACNC: 1.48 UIU/ML (ref 0.27–4.2)

## 2022-11-22 ENCOUNTER — OFFICE VISIT (OUTPATIENT)
Dept: FAMILY MEDICINE CLINIC | Facility: CLINIC | Age: 37
End: 2022-11-22

## 2022-11-22 ENCOUNTER — HOSPITAL ENCOUNTER (OUTPATIENT)
Facility: HOSPITAL | Age: 37
Discharge: HOME | End: 2022-11-23
Attending: EMERGENCY MEDICINE | Admitting: EMERGENCY MEDICINE

## 2022-11-22 VITALS — BODY MASS INDEX: 31.93 KG/M2 | TEMPERATURE: 97.9 F | HEIGHT: 68 IN

## 2022-11-22 DIAGNOSIS — K37 APPENDICITIS: ICD-10-CM

## 2022-11-22 DIAGNOSIS — R19.7 DIARRHEA, UNSPECIFIED TYPE: ICD-10-CM

## 2022-11-22 DIAGNOSIS — R10.84 GENERALIZED ABDOMINAL PAIN: ICD-10-CM

## 2022-11-22 DIAGNOSIS — R11.0 NAUSEA: ICD-10-CM

## 2022-11-22 DIAGNOSIS — K35.80 ACUTE APPENDICITIS, UNSPECIFIED ACUTE APPENDICITIS TYPE: Primary | ICD-10-CM

## 2022-11-22 DIAGNOSIS — R10.84 GENERALIZED ABDOMINAL PAIN: Primary | ICD-10-CM

## 2022-11-22 LAB
BASOPHILS # BLD AUTO: 0 10*3/MM3 (ref 0–0.2)
BASOPHILS NFR BLD AUTO: 0 % (ref 0–1.5)
BILIRUB UR QL STRIP: NEGATIVE
CLARITY UR: CLEAR
COLOR UR: YELLOW
DEPRECATED RDW RBC AUTO: 41.1 FL (ref 37–54)
EOSINOPHIL # BLD AUTO: 0 10*3/MM3 (ref 0–0.4)
EOSINOPHIL NFR BLD AUTO: 0 % (ref 0.3–6.2)
ERYTHROCYTE [DISTWIDTH] IN BLOOD BY AUTOMATED COUNT: 12.5 % (ref 12.3–15.4)
EXPIRATION DATE: NORMAL
FLUAV AG UPPER RESP QL IA.RAPID: NOT DETECTED
FLUBV AG UPPER RESP QL IA.RAPID: NOT DETECTED
GLUCOSE UR STRIP-MCNC: NEGATIVE MG/DL
HCG SERPL QL: NEGATIVE
HCT VFR BLD AUTO: 43.7 % (ref 34–46.6)
HGB BLD-MCNC: 14.2 G/DL (ref 12–15.9)
HGB UR QL STRIP.AUTO: ABNORMAL
IMM GRANULOCYTES # BLD AUTO: 0.02 10*3/MM3 (ref 0–0.05)
IMM GRANULOCYTES NFR BLD AUTO: 0.2 % (ref 0–0.5)
INTERNAL CONTROL: NORMAL
KETONES UR QL STRIP: NEGATIVE
LEUKOCYTE ESTERASE UR QL STRIP.AUTO: NEGATIVE
LYMPHOCYTES # BLD AUTO: 1.46 10*3/MM3 (ref 0.7–3.1)
LYMPHOCYTES NFR BLD AUTO: 14.7 % (ref 19.6–45.3)
Lab: NORMAL
MCH RBC QN AUTO: 29.5 PG (ref 26.6–33)
MCHC RBC AUTO-ENTMCNC: 32.5 G/DL (ref 31.5–35.7)
MCV RBC AUTO: 90.7 FL (ref 79–97)
MONOCYTES # BLD AUTO: 0.85 10*3/MM3 (ref 0.1–0.9)
MONOCYTES NFR BLD AUTO: 8.6 % (ref 5–12)
NEUTROPHILS NFR BLD AUTO: 7.6 10*3/MM3 (ref 1.7–7)
NEUTROPHILS NFR BLD AUTO: 76.5 % (ref 42.7–76)
NITRITE UR QL STRIP: NEGATIVE
NRBC BLD AUTO-RTO: 0 /100 WBC (ref 0–0.2)
PH UR STRIP.AUTO: 6 [PH] (ref 5–8)
PLATELET # BLD AUTO: 225 10*3/MM3 (ref 140–450)
PMV BLD AUTO: 10 FL (ref 6–12)
PROT UR QL STRIP: NEGATIVE
RBC # BLD AUTO: 4.82 10*6/MM3 (ref 3.77–5.28)
SARS-COV-2 AG UPPER RESP QL IA.RAPID: NOT DETECTED
SP GR UR STRIP: 1.02 (ref 1–1.03)
UROBILINOGEN UR QL STRIP: ABNORMAL
WBC NRBC COR # BLD: 9.93 10*3/MM3 (ref 3.4–10.8)

## 2022-11-22 PROCEDURE — 25010000002 HYDROMORPHONE PER 4 MG: Performed by: EMERGENCY MEDICINE

## 2022-11-22 PROCEDURE — 80053 COMPREHEN METABOLIC PANEL: CPT | Performed by: EMERGENCY MEDICINE

## 2022-11-22 PROCEDURE — 81001 URINALYSIS AUTO W/SCOPE: CPT | Performed by: EMERGENCY MEDICINE

## 2022-11-22 PROCEDURE — 96374 THER/PROPH/DIAG INJ IV PUSH: CPT

## 2022-11-22 PROCEDURE — 99214 OFFICE O/P EST MOD 30 MIN: CPT | Performed by: STUDENT IN AN ORGANIZED HEALTH CARE EDUCATION/TRAINING PROGRAM

## 2022-11-22 PROCEDURE — 99285 EMERGENCY DEPT VISIT HI MDM: CPT

## 2022-11-22 PROCEDURE — 85025 COMPLETE CBC W/AUTO DIFF WBC: CPT | Performed by: EMERGENCY MEDICINE

## 2022-11-22 PROCEDURE — 84703 CHORIONIC GONADOTROPIN ASSAY: CPT | Performed by: EMERGENCY MEDICINE

## 2022-11-22 PROCEDURE — 87428 SARSCOV & INF VIR A&B AG IA: CPT | Performed by: STUDENT IN AN ORGANIZED HEALTH CARE EDUCATION/TRAINING PROGRAM

## 2022-11-22 RX ORDER — SODIUM CHLORIDE 0.9 % (FLUSH) 0.9 %
10 SYRINGE (ML) INJECTION AS NEEDED
Status: DISCONTINUED | OUTPATIENT
Start: 2022-11-22 | End: 2022-12-07 | Stop reason: HOSPADM

## 2022-11-22 RX ORDER — HYDROMORPHONE HYDROCHLORIDE 1 MG/ML
0.5 INJECTION, SOLUTION INTRAMUSCULAR; INTRAVENOUS; SUBCUTANEOUS ONCE
Status: COMPLETED | OUTPATIENT
Start: 2022-11-22 | End: 2022-11-22

## 2022-11-22 RX ORDER — ONDANSETRON 4 MG/1
4 TABLET, ORALLY DISINTEGRATING ORAL EVERY 8 HOURS PRN
Qty: 30 TABLET | Refills: 0 | Status: SHIPPED | OUTPATIENT
Start: 2022-11-22 | End: 2022-12-07

## 2022-11-22 RX ORDER — HYDROCODONE BITARTRATE AND ACETAMINOPHEN 7.5; 325 MG/1; MG/1
1 TABLET ORAL EVERY 6 HOURS PRN
Qty: 10 TABLET | Refills: 0 | Status: SHIPPED | OUTPATIENT
Start: 2022-11-22 | End: 2022-11-25

## 2022-11-22 RX ADMIN — HYDROMORPHONE HYDROCHLORIDE 0.5 MG: 1 INJECTION, SOLUTION INTRAMUSCULAR; INTRAVENOUS; SUBCUTANEOUS at 23:21

## 2022-11-22 RX ADMIN — SODIUM CHLORIDE 1000 ML: 9 INJECTION, SOLUTION INTRAVENOUS at 22:45

## 2022-11-22 NOTE — PROGRESS NOTES
Zana Rosario,   [facility]  [address]  [place] [id number] [phone]    Subjective      Name Corby Huber MRN [id number]    1985 AGE/SEX 36 y.o. / female      Chief Complaint Chief Complaint   Patient presents with   • Nausea     Symptoms started at 04:00 22   • Vomiting   • Diarrhea         Visit History for  2022    History of Present Illness  Corby Huber is a 36 y.o. female who presented acute abdominal pain, nausea, and vomiting.   She is accompanied today by her mother.      Patient states that she wanted to go to the hospital, but mother wanted her to come to the doctor office first for evaluation.      Bother has been sick with diarrhea that started yesterday, but her's has been more severe.  Has been unable to keep food down and has not had much water.  He is having to use the restroom every 10 to 15 min.  More liquid bowel.  Initially thought that pain was from gas.  Started in the lower left.  Now pain is more generalized.  Pain is 9/10.  Has been able to keep a half tab of promethazine down and has helped some.  Tried some water with lemon and mint, but vomited.  Pain seems to be progressing and can't stand or sit up straight.        Medications and Allergies   Current Outpatient Medications   Medication Instructions   • Aimovig 70 mg, Subcutaneous, Every 30 Days   • Butalbital-APAP-Caff-Cod -93-30 MG capsule 1 tablet, Oral, 3 Times Daily PRN   • diphenhydrAMINE (BENADRYL) 25 mg, Oral, As Needed   • HYDROcodone-acetaminophen (NORCO) 7.5-325 MG per tablet 1 tablet, Oral, Every 6 Hours PRN   • Ibuprofen (ADVIL MIGRAINE PO) Oral   • levothyroxine (SYNTHROID) 150 mcg, Oral, Daily   • multivitamin (THERAGRAN) tablet tablet Oral, Daily   • Nurtec 75 mg, Oral, Daily PRN   • ondansetron ODT (ZOFRAN-ODT) 4 mg, Translingual, Every 8 Hours PRN   • promethazine (PHENERGAN) 25 mg, Oral, Every 6 Hours PRN   • SUMAtriptan (Imitrex) 50 MG  "tablet Take one tablet at onset of headache. May repeat dose one time in 2 hours if headache not relieved.   • Synthroid 137 MCG tablet No dose, route, or frequency recorded.   • topiramate (TOPAMAX) 50 mg, Oral, Nightly   • white petrolatum-dilTIAZem Apply to area at least 3 times per day     No Known Allergies   I have reviewed the above medications and allergies     Objective:      Vitals Vitals:    11/22/22 1123   Temp: 97.9 °F (36.6 °C)   TempSrc: Oral   Height: 172.7 cm (68\")     Body mass index is 31.93 kg/m².    Physical Exam  Constitutional:       Appearance: She is ill-appearing.      Comments: Fetal position on table wincing in pain   Abdominal:      General: Bowel sounds are normal.      Tenderness: There is generalized abdominal tenderness. There is guarding and rebound. Positive signs include McBurney's sign and psoas sign.   Neurological:      Mental Status: She is alert.            Assessment/Plan      Issues Addressed/ Plan  1. Generalized abdominal pain   - Patient is wanting something for her pain, but we do not have anything in the office that is injectable.  Toradol would be ideal.    - Advised to go to ED if pain continues to get worse, not improving, or continues to be unable to tolerate PO medications   - Possible appendicitis with positive rebound tenderness and positive McBurney sign.  However, she is having more generalized abdominal pain.  She is currently afebrile.  Advised her to go to the hospital if she continues to have no improvement with at home treatment.  - HYDROcodone-acetaminophen (NORCO) 7.5-325 MG per tablet; Take 1 tablet by mouth Every 6 (Six) Hours As Needed for Moderate Pain or Severe Pain for up to 3 days.  Dispense: 10 tablet; Refill: 0    2. Nausea  3. Diarrhea, unspecified type  - Going to provide dissolvable ondansetron for treatment to help relieve some nausea.  - Encouraged 24-hour liquid diet  - COVID and flu negative  - POCT SARS-CoV-2 Antigen LIDIA + Flu     There " are no Patient Instructions on file for this visit.      Follow up  recommended Return if symptoms worsen or fail to improve.   - Dragon voice recognition software was utilized to complete this chart.  Every reasonable attempt was made to edit and correct the text, however some incorrect words may remain.   Zana Rosario, DO

## 2022-11-23 ENCOUNTER — TELEPHONE (OUTPATIENT)
Dept: SURGERY | Facility: CLINIC | Age: 37
End: 2022-11-23

## 2022-11-23 ENCOUNTER — ANESTHESIA (OUTPATIENT)
Dept: PERIOP | Facility: HOSPITAL | Age: 37
End: 2022-11-23

## 2022-11-23 ENCOUNTER — ANESTHESIA EVENT (OUTPATIENT)
Dept: PERIOP | Facility: HOSPITAL | Age: 37
End: 2022-11-23

## 2022-11-23 ENCOUNTER — APPOINTMENT (OUTPATIENT)
Dept: CT IMAGING | Facility: HOSPITAL | Age: 37
End: 2022-11-23

## 2022-11-23 VITALS
OXYGEN SATURATION: 99 % | RESPIRATION RATE: 16 BRPM | HEIGHT: 68 IN | BODY MASS INDEX: 31.83 KG/M2 | HEART RATE: 68 BPM | DIASTOLIC BLOOD PRESSURE: 74 MMHG | SYSTOLIC BLOOD PRESSURE: 115 MMHG | TEMPERATURE: 98 F | WEIGHT: 210 LBS

## 2022-11-23 PROBLEM — K35.80 ACUTE APPENDICITIS, UNSPECIFIED ACUTE APPENDICITIS TYPE: Status: ACTIVE | Noted: 2022-11-23

## 2022-11-23 LAB
ALBUMIN SERPL-MCNC: 5 G/DL (ref 3.5–5.2)
ALBUMIN/GLOB SERPL: 2 G/DL
ALP SERPL-CCNC: 92 U/L (ref 39–117)
ALT SERPL W P-5'-P-CCNC: 16 U/L (ref 1–33)
ANION GAP SERPL CALCULATED.3IONS-SCNC: 11 MMOL/L (ref 5–15)
AST SERPL-CCNC: 18 U/L (ref 1–32)
BACTERIA UR QL AUTO: ABNORMAL /HPF
BILIRUB SERPL-MCNC: 0.5 MG/DL (ref 0–1.2)
BUN SERPL-MCNC: 6 MG/DL (ref 6–20)
BUN/CREAT SERPL: 9.1 (ref 7–25)
CALCIUM SPEC-SCNC: 9.6 MG/DL (ref 8.6–10.5)
CHLORIDE SERPL-SCNC: 101 MMOL/L (ref 98–107)
CO2 SERPL-SCNC: 25 MMOL/L (ref 22–29)
CREAT SERPL-MCNC: 0.66 MG/DL (ref 0.57–1)
EGFRCR SERPLBLD CKD-EPI 2021: 116 ML/MIN/1.73
GLOBULIN UR ELPH-MCNC: 2.5 GM/DL
GLUCOSE SERPL-MCNC: 107 MG/DL (ref 65–99)
HYALINE CASTS UR QL AUTO: ABNORMAL /LPF
POTASSIUM SERPL-SCNC: 4 MMOL/L (ref 3.5–5.2)
PROT SERPL-MCNC: 7.5 G/DL (ref 6–8.5)
RBC # UR STRIP: ABNORMAL /HPF
REF LAB TEST METHOD: ABNORMAL
SODIUM SERPL-SCNC: 137 MMOL/L (ref 136–145)
SQUAMOUS #/AREA URNS HPF: ABNORMAL /HPF
WBC # UR STRIP: ABNORMAL /HPF

## 2022-11-23 PROCEDURE — 96375 TX/PRO/DX INJ NEW DRUG ADDON: CPT

## 2022-11-23 PROCEDURE — 25010000002 CEFOXITIN PER 1 G: Performed by: SURGERY

## 2022-11-23 PROCEDURE — 44970 LAPAROSCOPY APPENDECTOMY: CPT | Performed by: SURGERY

## 2022-11-23 PROCEDURE — 25010000002 ONDANSETRON PER 1 MG: Performed by: NURSE ANESTHETIST, CERTIFIED REGISTERED

## 2022-11-23 PROCEDURE — 25010000002 DEXAMETHASONE SODIUM PHOSPHATE 20 MG/5ML SOLUTION: Performed by: NURSE ANESTHETIST, CERTIFIED REGISTERED

## 2022-11-23 PROCEDURE — 25010000002 FENTANYL CITRATE (PF) 50 MCG/ML SOLUTION: Performed by: NURSE ANESTHETIST, CERTIFIED REGISTERED

## 2022-11-23 PROCEDURE — 74177 CT ABD & PELVIS W/CONTRAST: CPT

## 2022-11-23 PROCEDURE — 25010000002 KETOROLAC TROMETHAMINE PER 15 MG: Performed by: NURSE ANESTHETIST, CERTIFIED REGISTERED

## 2022-11-23 PROCEDURE — G0378 HOSPITAL OBSERVATION PER HR: HCPCS

## 2022-11-23 PROCEDURE — 25010000002 PROPOFOL 10 MG/ML EMULSION: Performed by: NURSE ANESTHETIST, CERTIFIED REGISTERED

## 2022-11-23 PROCEDURE — 25010000002 KETOROLAC TROMETHAMINE PER 15 MG: Performed by: EMERGENCY MEDICINE

## 2022-11-23 PROCEDURE — 88304 TISSUE EXAM BY PATHOLOGIST: CPT | Performed by: SURGERY

## 2022-11-23 PROCEDURE — S0260 H&P FOR SURGERY: HCPCS | Performed by: SURGERY

## 2022-11-23 PROCEDURE — 25010000002 IOPAMIDOL 61 % SOLUTION: Performed by: EMERGENCY MEDICINE

## 2022-11-23 PROCEDURE — 25010000002 MIDAZOLAM PER 1 MG: Performed by: ANESTHESIOLOGY

## 2022-11-23 DEVICE — THE ECHELON, ECHELON ENDOPATH™ AND ECHELON FLEX™ FAMILIES OF ENDOSCOPIC LINEAR CUTTERS AND RELOADS ARE STERILE, SINGLE PATIENT USE INSTRUMENTS THAT SIMULTANEOUSLY CUT AND STAPLE TISSUE. THERE ARE SIX STAGGERED ROWS OF STAPLES, THREE ON EITHER SIDE OF THE CUT LINE. THE 45 MM INSTRUMENTS HAVE A STAPLE LINE THATIS APPROXIMATELY 45 MM LONG AND A CUT LINE THAT IS APPROXIMATELY 42 MM LONG. THE SHAFT CAN ROTATE FREELY IN BOTH DIRECTIONS AND AN ARTICULATION MECHANISM ON ARTICULATING INSTRUMENTS ENABLES BENDING THE DISTAL PORTIONOF THE SHAFT TO FACILITATE LATERAL ACCESS OF THE OPERATIVE SITE.THE INSTRUMENTS ARE SHIPPED WITHOUT A RELOAD AND MUST BE LOADED PRIOR TO USE. A STAPLE RETAINING CAP ON THE RELOAD PROTECTS THE STAPLE LEG POINTS DURING SHIPPING AND TRANSPORTATION. THE INSTRUMENTS’ LOCK-OUT FEATURE IS DESIGNED TO PREVENT A USED RELOAD FROM BEING REFIRED.
Type: IMPLANTABLE DEVICE | Site: ABDOMEN | Status: FUNCTIONAL
Brand: ECHELON ENDOPATH

## 2022-11-23 RX ORDER — LABETALOL HYDROCHLORIDE 5 MG/ML
5 INJECTION, SOLUTION INTRAVENOUS
Status: DISCONTINUED | OUTPATIENT
Start: 2022-11-23 | End: 2022-12-07 | Stop reason: HOSPADM

## 2022-11-23 RX ORDER — SUCCINYLCHOLINE/SOD CL,ISO/PF 200MG/10ML
SYRINGE (ML) INTRAVENOUS AS NEEDED
Status: DISCONTINUED | OUTPATIENT
Start: 2022-11-23 | End: 2022-11-23 | Stop reason: SURG

## 2022-11-23 RX ORDER — HYDRALAZINE HYDROCHLORIDE 20 MG/ML
5 INJECTION INTRAMUSCULAR; INTRAVENOUS
Status: DISCONTINUED | OUTPATIENT
Start: 2022-11-23 | End: 2022-12-07 | Stop reason: HOSPADM

## 2022-11-23 RX ORDER — HYDROMORPHONE HYDROCHLORIDE 1 MG/ML
0.5 INJECTION, SOLUTION INTRAMUSCULAR; INTRAVENOUS; SUBCUTANEOUS
Status: DISCONTINUED | OUTPATIENT
Start: 2022-11-23 | End: 2022-12-07 | Stop reason: HOSPADM

## 2022-11-23 RX ORDER — ONDANSETRON 2 MG/ML
INJECTION INTRAMUSCULAR; INTRAVENOUS AS NEEDED
Status: DISCONTINUED | OUTPATIENT
Start: 2022-11-23 | End: 2022-11-23 | Stop reason: SURG

## 2022-11-23 RX ORDER — FLUMAZENIL 0.1 MG/ML
0.2 INJECTION INTRAVENOUS AS NEEDED
Status: DISCONTINUED | OUTPATIENT
Start: 2022-11-23 | End: 2022-12-07 | Stop reason: HOSPADM

## 2022-11-23 RX ORDER — DIPHENHYDRAMINE HCL 25 MG
25 CAPSULE ORAL
Status: DISCONTINUED | OUTPATIENT
Start: 2022-11-23 | End: 2022-12-07 | Stop reason: HOSPADM

## 2022-11-23 RX ORDER — BUPIVACAINE HYDROCHLORIDE AND EPINEPHRINE 5; 5 MG/ML; UG/ML
INJECTION, SOLUTION EPIDURAL; INTRACAUDAL; PERINEURAL AS NEEDED
Status: DISCONTINUED | OUTPATIENT
Start: 2022-11-23 | End: 2022-11-23 | Stop reason: HOSPADM

## 2022-11-23 RX ORDER — ROCURONIUM BROMIDE 10 MG/ML
INJECTION, SOLUTION INTRAVENOUS AS NEEDED
Status: DISCONTINUED | OUTPATIENT
Start: 2022-11-23 | End: 2022-11-23 | Stop reason: SURG

## 2022-11-23 RX ORDER — GLYCOPYRROLATE 0.2 MG/ML
INJECTION INTRAMUSCULAR; INTRAVENOUS AS NEEDED
Status: DISCONTINUED | OUTPATIENT
Start: 2022-11-23 | End: 2022-11-23 | Stop reason: SURG

## 2022-11-23 RX ORDER — PROMETHAZINE HYDROCHLORIDE 25 MG/1
25 TABLET ORAL ONCE AS NEEDED
Status: DISCONTINUED | OUTPATIENT
Start: 2022-11-23 | End: 2022-12-07 | Stop reason: HOSPADM

## 2022-11-23 RX ORDER — ONDANSETRON 2 MG/ML
4 INJECTION INTRAMUSCULAR; INTRAVENOUS ONCE AS NEEDED
Status: DISCONTINUED | OUTPATIENT
Start: 2022-11-23 | End: 2022-12-07 | Stop reason: HOSPADM

## 2022-11-23 RX ORDER — FENTANYL CITRATE 50 UG/ML
INJECTION, SOLUTION INTRAMUSCULAR; INTRAVENOUS AS NEEDED
Status: DISCONTINUED | OUTPATIENT
Start: 2022-11-23 | End: 2022-11-23 | Stop reason: SURG

## 2022-11-23 RX ORDER — SODIUM CHLORIDE 0.9 % (FLUSH) 0.9 %
3-10 SYRINGE (ML) INJECTION AS NEEDED
Status: DISCONTINUED | OUTPATIENT
Start: 2022-11-23 | End: 2022-11-23 | Stop reason: HOSPADM

## 2022-11-23 RX ORDER — KETOROLAC TROMETHAMINE 30 MG/ML
INJECTION, SOLUTION INTRAMUSCULAR; INTRAVENOUS AS NEEDED
Status: DISCONTINUED | OUTPATIENT
Start: 2022-11-23 | End: 2022-11-23 | Stop reason: SURG

## 2022-11-23 RX ORDER — PROMETHAZINE HYDROCHLORIDE 25 MG/1
25 SUPPOSITORY RECTAL ONCE AS NEEDED
Status: DISCONTINUED | OUTPATIENT
Start: 2022-11-23 | End: 2022-12-07 | Stop reason: HOSPADM

## 2022-11-23 RX ORDER — SODIUM CHLORIDE 0.9 % (FLUSH) 0.9 %
3 SYRINGE (ML) INJECTION EVERY 12 HOURS SCHEDULED
Status: DISCONTINUED | OUTPATIENT
Start: 2022-11-23 | End: 2022-11-23 | Stop reason: HOSPADM

## 2022-11-23 RX ORDER — DEXAMETHASONE SODIUM PHOSPHATE 4 MG/ML
INJECTION, SOLUTION INTRA-ARTICULAR; INTRALESIONAL; INTRAMUSCULAR; INTRAVENOUS; SOFT TISSUE AS NEEDED
Status: DISCONTINUED | OUTPATIENT
Start: 2022-11-23 | End: 2022-11-23 | Stop reason: SURG

## 2022-11-23 RX ORDER — EPHEDRINE SULFATE 50 MG/ML
5 INJECTION, SOLUTION INTRAVENOUS ONCE AS NEEDED
Status: DISCONTINUED | OUTPATIENT
Start: 2022-11-23 | End: 2022-12-07 | Stop reason: HOSPADM

## 2022-11-23 RX ORDER — OXYCODONE AND ACETAMINOPHEN 7.5; 325 MG/1; MG/1
1 TABLET ORAL EVERY 4 HOURS PRN
Status: DISCONTINUED | OUTPATIENT
Start: 2022-11-23 | End: 2022-11-30 | Stop reason: HOSPADM

## 2022-11-23 RX ORDER — MIDAZOLAM HYDROCHLORIDE 1 MG/ML
1 INJECTION INTRAMUSCULAR; INTRAVENOUS
Status: DISCONTINUED | OUTPATIENT
Start: 2022-11-23 | End: 2022-11-23 | Stop reason: HOSPADM

## 2022-11-23 RX ORDER — FAMOTIDINE 10 MG/ML
20 INJECTION, SOLUTION INTRAVENOUS ONCE
Status: COMPLETED | OUTPATIENT
Start: 2022-11-23 | End: 2022-11-23

## 2022-11-23 RX ORDER — PROPOFOL 10 MG/ML
VIAL (ML) INTRAVENOUS AS NEEDED
Status: DISCONTINUED | OUTPATIENT
Start: 2022-11-23 | End: 2022-11-23 | Stop reason: SURG

## 2022-11-23 RX ORDER — HYDROCODONE BITARTRATE AND ACETAMINOPHEN 7.5; 325 MG/1; MG/1
1 TABLET ORAL ONCE AS NEEDED
Status: DISCONTINUED | OUTPATIENT
Start: 2022-11-23 | End: 2022-12-07 | Stop reason: HOSPADM

## 2022-11-23 RX ORDER — KETOROLAC TROMETHAMINE 15 MG/ML
15 INJECTION, SOLUTION INTRAMUSCULAR; INTRAVENOUS ONCE
Status: COMPLETED | OUTPATIENT
Start: 2022-11-23 | End: 2022-11-23

## 2022-11-23 RX ORDER — CEFOXITIN 2 G/1
2 INJECTION, POWDER, FOR SOLUTION INTRAVENOUS
Status: DISCONTINUED | OUTPATIENT
Start: 2022-11-24 | End: 2022-11-23 | Stop reason: SDUPTHER

## 2022-11-23 RX ORDER — NALOXONE HCL 0.4 MG/ML
0.2 VIAL (ML) INJECTION AS NEEDED
Status: DISCONTINUED | OUTPATIENT
Start: 2022-11-23 | End: 2022-12-07 | Stop reason: HOSPADM

## 2022-11-23 RX ORDER — MAGNESIUM HYDROXIDE 1200 MG/15ML
LIQUID ORAL AS NEEDED
Status: DISCONTINUED | OUTPATIENT
Start: 2022-11-23 | End: 2022-11-23 | Stop reason: HOSPADM

## 2022-11-23 RX ORDER — SODIUM CHLORIDE, SODIUM LACTATE, POTASSIUM CHLORIDE, CALCIUM CHLORIDE 600; 310; 30; 20 MG/100ML; MG/100ML; MG/100ML; MG/100ML
9 INJECTION, SOLUTION INTRAVENOUS CONTINUOUS
Status: DISCONTINUED | OUTPATIENT
Start: 2022-11-23 | End: 2022-12-07 | Stop reason: HOSPADM

## 2022-11-23 RX ORDER — LIDOCAINE HYDROCHLORIDE 20 MG/ML
INJECTION, SOLUTION EPIDURAL; INFILTRATION; INTRACAUDAL; PERINEURAL AS NEEDED
Status: DISCONTINUED | OUTPATIENT
Start: 2022-11-23 | End: 2022-11-23 | Stop reason: SURG

## 2022-11-23 RX ORDER — FENTANYL CITRATE 50 UG/ML
50 INJECTION, SOLUTION INTRAMUSCULAR; INTRAVENOUS
Status: DISCONTINUED | OUTPATIENT
Start: 2022-11-23 | End: 2022-11-23 | Stop reason: HOSPADM

## 2022-11-23 RX ORDER — DIPHENHYDRAMINE HYDROCHLORIDE 50 MG/ML
12.5 INJECTION INTRAMUSCULAR; INTRAVENOUS
Status: DISCONTINUED | OUTPATIENT
Start: 2022-11-23 | End: 2022-12-07 | Stop reason: HOSPADM

## 2022-11-23 RX ORDER — FENTANYL CITRATE 50 UG/ML
50 INJECTION, SOLUTION INTRAMUSCULAR; INTRAVENOUS
Status: DISCONTINUED | OUTPATIENT
Start: 2022-11-23 | End: 2022-12-07 | Stop reason: HOSPADM

## 2022-11-23 RX ORDER — LIDOCAINE HYDROCHLORIDE 10 MG/ML
0.5 INJECTION, SOLUTION EPIDURAL; INFILTRATION; INTRACAUDAL; PERINEURAL ONCE AS NEEDED
Status: DISCONTINUED | OUTPATIENT
Start: 2022-11-23 | End: 2022-11-23 | Stop reason: HOSPADM

## 2022-11-23 RX ADMIN — PROPOFOL 200 MG: 10 INJECTION, EMULSION INTRAVENOUS at 05:01

## 2022-11-23 RX ADMIN — CEFOXITIN SODIUM 2 G: 2 POWDER, FOR SOLUTION INTRAVENOUS at 04:56

## 2022-11-23 RX ADMIN — MIDAZOLAM 1 MG: 1 INJECTION INTRAMUSCULAR; INTRAVENOUS at 04:45

## 2022-11-23 RX ADMIN — IOPAMIDOL 85 ML: 612 INJECTION, SOLUTION INTRAVENOUS at 02:16

## 2022-11-23 RX ADMIN — PROPOFOL 50 MG: 10 INJECTION, EMULSION INTRAVENOUS at 05:03

## 2022-11-23 RX ADMIN — FAMOTIDINE 20 MG: 10 INJECTION INTRAVENOUS at 04:44

## 2022-11-23 RX ADMIN — FENTANYL CITRATE 50 MCG: 50 INJECTION, SOLUTION INTRAMUSCULAR; INTRAVENOUS at 05:07

## 2022-11-23 RX ADMIN — DEXAMETHASONE SODIUM PHOSPHATE 8 MG: 4 INJECTION, SOLUTION INTRAMUSCULAR; INTRAVENOUS at 05:04

## 2022-11-23 RX ADMIN — SODIUM CHLORIDE, POTASSIUM CHLORIDE, SODIUM LACTATE AND CALCIUM CHLORIDE 9 ML/HR: 600; 310; 30; 20 INJECTION, SOLUTION INTRAVENOUS at 04:35

## 2022-11-23 RX ADMIN — GLYCOPYRROLATE 0.1 MCG: 1 INJECTION INTRAMUSCULAR; INTRAVENOUS at 05:24

## 2022-11-23 RX ADMIN — KETOROLAC TROMETHAMINE 30 MG: 30 INJECTION, SOLUTION INTRAMUSCULAR at 05:35

## 2022-11-23 RX ADMIN — ROCURONIUM BROMIDE 40 MG: 10 INJECTION, SOLUTION INTRAVENOUS at 05:01

## 2022-11-23 RX ADMIN — LIDOCAINE HYDROCHLORIDE 80 MG: 20 INJECTION, SOLUTION EPIDURAL; INFILTRATION; INTRACAUDAL; PERINEURAL at 05:01

## 2022-11-23 RX ADMIN — ONDANSETRON 4 MG: 2 INJECTION INTRAMUSCULAR; INTRAVENOUS at 05:14

## 2022-11-23 RX ADMIN — KETOROLAC TROMETHAMINE 15 MG: 15 INJECTION, SOLUTION INTRAMUSCULAR; INTRAVENOUS at 01:06

## 2022-11-23 RX ADMIN — Medication 120 MG: at 05:01

## 2022-11-23 RX ADMIN — SUGAMMADEX 200 MG: 100 INJECTION, SOLUTION INTRAVENOUS at 05:50

## 2022-11-23 NOTE — ANESTHESIA PREPROCEDURE EVALUATION
Anesthesia Evaluation     Patient summary reviewed and Nursing notes reviewed   no history of anesthetic complications:  NPO Solid Status: > 8 hours  NPO Liquid Status: > 8 hours           Airway   Mallampati: II  TM distance: >3 FB  Neck ROM: full  No difficulty expected  Dental - normal exam     Pulmonary    (-) rhonchi, decreased breath sounds, wheezes, not a smoker  Cardiovascular   Exercise tolerance: good (4-7 METS)    Rhythm: regular  Rate: normal    (-) hypertension, CAD, angina, RENTERIA, murmur      Neuro/Psych  (+) headaches, numbness,    (-) CVA  GI/Hepatic/Renal/Endo    (+)   thyroid problem hypothyroidism  (-) no renal disease, diabetes    ROS Comment: Acute appendicitis    Musculoskeletal     Abdominal     Abdomen: soft.   Substance History      OB/GYN          Other   arthritis,                      Anesthesia Plan    ASA 2 - emergent     general   Rapid sequence  intravenous induction     Anesthetic plan, risks, benefits, and alternatives have been provided, discussed and informed consent has been obtained with: patient.        CODE STATUS:

## 2022-11-23 NOTE — ANESTHESIA POSTPROCEDURE EVALUATION
"Patient: Corby Huber    Procedure Summary     Date: 11/23/22 Room / Location: [facility] OR [address] [place] [address] MAIN OR    Anesthesia Start: 0455 Anesthesia Stop: 0608    Procedure: APPENDECTOMY LAPAROSCOPIC (Abdomen) Diagnosis:     Surgeons: Mildred Munroe MD Provider: Michael Ewing MD    Anesthesia Type: general ASA Status: 2 - Emergent          Anesthesia Type: general    Vitals  Vitals Value Taken Time   /74 11/23/22 0746   Temp 36.7 °C (98 °F) 11/23/22 0601   Pulse 69 11/23/22 0749   Resp 16 11/23/22 0700   SpO2 100 % 11/23/22 0749   Vitals shown include unvalidated device data.        Post Anesthesia Care and Evaluation    Patient location during evaluation: bedside  Patient participation: complete - patient participated  Level of consciousness: awake and alert  Pain management: adequate    Airway patency: patent  Anesthetic complications: No anesthetic complications  PONV Status: none  Cardiovascular status: acceptable  Respiratory status: acceptable  Hydration status: acceptable    Comments: /74 (BP Location: Right arm, Patient Position: Lying)   Pulse 69   Temp 36.7 °C (98 °F) (Oral)   Resp 16   Ht 172.7 cm (68\")   Wt 95.3 kg (210 lb)   LMP 09/14/2022 Comment: HCG negative  SpO2 100%   BMI 31.93 kg/m²         "

## 2022-11-23 NOTE — ANESTHESIA PROCEDURE NOTES
Airway  Urgency: elective    Date/Time: 11/23/2022 5:02 AM  Airway not difficult    General Information and Staff    Patient location during procedure: OR  Anesthesiologist: Michael Ewing MD  CRNA/CAA: Nichole Grijalva CRNA    Indications and Patient Condition  Indications for airway management: airway protection    Preoxygenated: yes  Mask difficulty assessment: 0 - not attempted    Final Airway Details  Final airway type: endotracheal airway      Successful airway: ETT  Cuffed: yes   Successful intubation technique: direct laryngoscopy and RSI  Facilitating devices/methods: intubating stylet  Endotracheal tube insertion site: oral  Blade: Gentile  Blade size: 2  ETT size (mm): 7.0  Cormack-Lehane Classification: grade I - full view of glottis  Placement verified by: chest auscultation and capnometry   Cuff volume (mL): 6  Measured from: teeth  ETT/EBT  to teeth (cm): 20  Number of attempts at approach: 1  Assessment: lips, teeth, and gum same as pre-op and atraumatic intubation

## 2022-11-24 PROCEDURE — G0378 HOSPITAL OBSERVATION PER HR: HCPCS

## 2022-11-25 LAB
LAB AP CASE REPORT: NORMAL
PATH REPORT.FINAL DX SPEC: NORMAL
PATH REPORT.GROSS SPEC: NORMAL

## 2022-11-25 PROCEDURE — G0378 HOSPITAL OBSERVATION PER HR: HCPCS

## 2022-11-26 PROCEDURE — G0378 HOSPITAL OBSERVATION PER HR: HCPCS

## 2022-11-27 PROCEDURE — G0378 HOSPITAL OBSERVATION PER HR: HCPCS

## 2022-11-28 PROCEDURE — G0378 HOSPITAL OBSERVATION PER HR: HCPCS

## 2022-11-29 PROCEDURE — G0378 HOSPITAL OBSERVATION PER HR: HCPCS

## 2022-11-30 PROCEDURE — G0378 HOSPITAL OBSERVATION PER HR: HCPCS

## 2022-12-01 PROCEDURE — G0378 HOSPITAL OBSERVATION PER HR: HCPCS

## 2022-12-02 PROCEDURE — G0378 HOSPITAL OBSERVATION PER HR: HCPCS

## 2022-12-03 PROCEDURE — G0378 HOSPITAL OBSERVATION PER HR: HCPCS

## 2022-12-04 PROCEDURE — G0378 HOSPITAL OBSERVATION PER HR: HCPCS

## 2022-12-05 PROCEDURE — G0378 HOSPITAL OBSERVATION PER HR: HCPCS

## 2022-12-06 PROCEDURE — G0378 HOSPITAL OBSERVATION PER HR: HCPCS

## 2022-12-07 ENCOUNTER — OFFICE VISIT (OUTPATIENT)
Dept: SURGERY | Facility: CLINIC | Age: 37
End: 2022-12-07

## 2022-12-07 DIAGNOSIS — K35.30 ACUTE APPENDICITIS WITH LOCALIZED PERITONITIS, WITHOUT PERFORATION, ABSCESS, OR GANGRENE: Primary | ICD-10-CM

## 2022-12-07 PROCEDURE — 99024 POSTOP FOLLOW-UP VISIT: CPT | Performed by: SURGERY

## 2022-12-07 NOTE — PROGRESS NOTES
General Surgery Post-Operative Follow Up Note     History of Present Illness:    Corby Huber is a 36 y.o. year old female who presents for post-operative follow up from lap appendectomy.  She has been doing well.  Reports pain in her left lower quadrant incision with moving.  She has been putting Neosporin on her incisions and has some redness of the skin surrounding each incision.  This appears more of a skin reaction than cellulitis.  No fevers or chills.  Mild abdominal bloating.    Procedure:    • Laparoscopic appendectomy    Pathology:    1. Appendix, Appendectomy: Benign appendix with                A. Acute appendicitis.                B. Serositis.    Physical Exam:   • Constitutional: Well-developed well-nourished, no acute distress  • Eyes: Conjunctiva normal, sclera nonicteric  • ENMT: Hearing grossly normal, oral mucosa moist  • Respiratory: No increased work of breathing, normal inspiratory effort  • Cardiovascular: Regular rate, no peripheral edema, no jugular venous distention  • Gastrointestinal: Soft, nontender, incisions clean, dry and intact with some surrounding erythema of the skin.  This appears to be an allergic reaction to either the skin glue or Neosporin.  • Skin:  Warm, dry, no rash on visualized skin surfaces  • Musculoskeletal: Symmetric strength, normal gait  • Psychiatric: Alert and oriented ×3, normal affect       Assessment/Plan:  1.  Postoperative visit after laparoscopic appendectomy  -Okay to resume all activities as tolerated.  Slowly increase amount lifting and pay attention to any sharp pain in the abdomen when doing so.  -Instructed her to avoid using Neosporin.  She can use any unscented lotion as needed.  I recommended Aquaphor.  -Follow-up with me as needed.      Mildred Munroe M.D.  General, Robotic and Endoscopic Surgery  [facility]    [address] [place] [address], [address]  [place], [place], [address]  P: [phone]  F: [phone]

## 2022-12-07 NOTE — DISCHARGE SUMMARY
GENERAL SURGERY      DATE OF ADMISSION:  11/23/22  DATE OF DISCHARGE:  11/23/22    ATTENDING:     Mildred Munroe M.D.           CONSULTS:    None    PRINCIPAL DIAGNOSIS:     Acute appendicitis    DISCHARGE DIAGNOSES:     same    HOSPITAL PROCEDURES:     [facility]    HOSPITAL COURSE:   Uncomplicated. Discharged immediately postop.    ACTIVITY:  Okay to shower.  Ambulate and climb stairs as tolerated.  No lifting over 10 lbs for 2 weeks.  Okay to drive if not taking pain medications.    DIET:  Regular.    FOLLOW UP:  With Dr. Munroe in 2 weeks. Instructed to call [phone] for an appointment.

## 2023-01-05 ENCOUNTER — TELEPHONE (OUTPATIENT)
Dept: NEUROLOGY | Facility: CLINIC | Age: 38
End: 2023-01-05
Payer: COMMERCIAL

## 2023-01-05 RX ORDER — ERENUMAB-AOOE 70 MG/ML
INJECTION SUBCUTANEOUS
Qty: 1 ML | Refills: 2 | Status: SHIPPED | OUTPATIENT
Start: 2023-01-05 | End: 2023-01-12 | Stop reason: SDUPTHER

## 2023-01-05 NOTE — TELEPHONE ENCOUNTER
Provider: SORIN ALBERTO APRN    Caller: AMAL    Relationship to Patient: SELF    Phone Number: [phone]    Reason for Call: PT CALLING STATED THAT THE INSURANCE FAXED OVER A FORM FOR THE NURTEC 75 MGTO BE COVERED (PA).   DID OFFICE FAX BACK TO [facility]?  STATED THE PHONE NUMBER [phone] (COVER MY MEDS)  AND THE PROVIDER CAN CALL.    ALSO PT STATED HER PHARMACY AND WAS TOLD HER AIMOVIG 70 MG NEEDS A PA.      PT STATED SHE IS OUT OF HER AIMOVIG.     PLEASE CALL & ADVISE

## 2023-01-05 NOTE — TELEPHONE ENCOUNTER
Left a message for patient, I sent appeal on Nurtec. Aimovig was approved stating no PA required.

## 2023-01-10 ENCOUNTER — OFFICE VISIT (OUTPATIENT)
Dept: FAMILY MEDICINE CLINIC | Facility: CLINIC | Age: 38
End: 2023-01-10
Payer: COMMERCIAL

## 2023-01-10 VITALS
WEIGHT: 208 LBS | OXYGEN SATURATION: 100 % | HEART RATE: 91 BPM | TEMPERATURE: 97.1 F | HEIGHT: 68 IN | DIASTOLIC BLOOD PRESSURE: 62 MMHG | SYSTOLIC BLOOD PRESSURE: 112 MMHG | BODY MASS INDEX: 31.52 KG/M2

## 2023-01-10 DIAGNOSIS — E03.9 HYPOTHYROIDISM, UNSPECIFIED TYPE: Primary | ICD-10-CM

## 2023-01-10 DIAGNOSIS — E06.3 HASHIMOTO'S DISEASE: ICD-10-CM

## 2023-01-10 PROCEDURE — 99213 OFFICE O/P EST LOW 20 MIN: CPT | Performed by: INTERNAL MEDICINE

## 2023-01-10 NOTE — PROGRESS NOTES
Subjective   Corby Huber is a 37 y.o. female.     Chief Complaint   Patient presents with   • Hashimoto's Thyroiditis   • Hypothyroidism   • Amenorrhea       History of Present Illness   Patient here follow-up for hypothyroidism.  Taking Synthroid.  She has appointment with the endocrinologist in April.  Still feeling tired.  She has seen a gynecologist for her amenorrhea nothing was found.  Patient had flu last week with laryngitis.  She took Amoxil.  Feeling better now  The following portions of the patient's history were reviewed and updated as appropriate: allergies, current medications, past family history, past medical history, past social history, past surgical history and problem list.    Review of Systems   Constitutional: Negative for activity change, appetite change, fatigue and fever.   Eyes: Negative for blurred vision and double vision.   Respiratory: Negative.  Negative for shortness of breath.    Cardiovascular: Negative for chest pain, palpitations and leg swelling.   Gastrointestinal: Negative.    Genitourinary: Negative for hematuria.   Neurological: Negative for dizziness, syncope, light-headedness and headache.       No Known Allergies    Current Outpatient Medications on File Prior to Visit   Medication Sig Dispense Refill   • Aimovig 70 MG/ML auto-injector INJECT 1ML UNDER THE SKIN INTO THE APPRORIATE AREA AS DIRECTED EVERY 30 DAYS 1 mL 2   • multivitamin (THERAGRAN) tablet tablet Take  by mouth Daily.     • Synthroid 137 MCG tablet Take 137 mcg by mouth Daily.     • Butalbital-APAP-Caff-Cod -35-30 MG capsule Take 1 tablet by mouth 3 (Three) Times a Day As Needed (headache). 30 capsule 0   • diphenhydrAMINE (BENADRYL) 25 mg capsule Take 25 mg by mouth As Needed.     • Ibuprofen (ADVIL MIGRAINE PO) Take 1 tablet by mouth Daily As Needed.     • levothyroxine (Synthroid) 150 MCG tablet Take 1 tablet by mouth Daily. 90 tablet 3   • promethazine (PHENERGAN) 50 MG tablet Take 0.5 tablets by  mouth Every 6 (Six) Hours As Needed for Nausea or Vomiting. 12 tablet 1   • Rimegepant Sulfate (Nurtec) 75 MG tablet dispersible tablet Take 1 tablet by mouth Daily As Needed (migraine). 16 tablet 5   • SUMAtriptan (Imitrex) 50 MG tablet Take one tablet at onset of headache. May repeat dose one time in 2 hours if headache not relieved. 10 tablet 3   • topiramate (Topamax) 25 MG tablet Take 2 tablets by mouth Every Night. 60 tablet 5   • white petrolatum-dilTIAZem Apply to area at least 3 times per day 30 g 3     No current facility-administered medications on file prior to visit.       Family History   Problem Relation Age of Onset   • Liver disease Father    • Diabetes Maternal Uncle    • Heart disease Maternal Uncle    • Hypertension Maternal Uncle    • Arthritis Maternal Grandmother    • Heart disease Maternal Grandmother    • Hypertension Maternal Grandmother    • Seizures Maternal Grandfather        Past Medical History:   Diagnosis Date   • Environmental allergies    • Fissure, anal    • Hypothyroidism        Past Surgical History:   Procedure Laterality Date   • APPENDECTOMY N/A 11/23/2022    Procedure: APPENDECTOMY LAPAROSCOPIC;  Surgeon: Mildred Munroe MD;  Location: [facility] OR;  Service: General;  Laterality: N/A;   • FOOT SURGERY N/A 01/2022   • HEMORRHOIDECTOMY  May 2010       Social History     Socioeconomic History   • Marital status: Single   Tobacco Use   • Smoking status: Never   • Smokeless tobacco: Never   Vaping Use   • Vaping Use: Never used   Substance and Sexual Activity   • Alcohol use: Never   • Drug use: Never   • Sexual activity: Defer       Patient Active Problem List   Diagnosis   • Ankle injury, left, initial encounter   • Benign neoplasm of cerebral meninges (HCC)   • Lumbar disc herniation with radiculopathy   • Lumbar spondylosis with myelopathy   • Hashimoto's disease   • Hypothyroidism   • Intractable migraine without aura and without status migrainosus   • Vitamin D  deficiency   • Periodic headache syndrome, not intractable   • Acute appendicitis, unspecified acute appendicitis type       /62 (BP Location: Left arm, Patient Position: Sitting, Cuff Size: Large Adult)   Pulse 91   Temp 97.1 °F (36.2 °C) (Temporal)   Ht 172.7 cm (68\")   Wt 94.3 kg (208 lb)   SpO2 100%   BMI 31.63 kg/m²   Body mass index is 31.63 kg/m².    Objective   Physical Exam  Vitals and nursing note reviewed.   Constitutional:       Appearance: She is well-developed.   Eyes:      Pupils: Pupils are equal, round, and reactive to light.   Neck:      Thyroid: No thyromegaly.      Vascular: No JVD.      Trachea: No tracheal deviation.   Cardiovascular:      Rate and Rhythm: Normal rate and regular rhythm.      Heart sounds: No murmur heard.  Pulmonary:      Effort: Pulmonary effort is normal. No respiratory distress.      Breath sounds: Normal breath sounds. No wheezing.   Abdominal:      General: Bowel sounds are normal. There is no distension.      Palpations: Abdomen is soft. There is no mass.      Tenderness: There is no abdominal tenderness. There is no right CVA tenderness, left CVA tenderness, guarding or rebound.      Hernia: No hernia is present.   Musculoskeletal:      Cervical back: Normal range of motion and neck supple.   Lymphadenopathy:      Cervical: No cervical adenopathy.   Neurological:      General: No focal deficit present.      Mental Status: She is alert and oriented to person, place, and time.   Psychiatric:         Mood and Affect: Mood normal.         Behavior: Behavior normal.           Assessment & Plan   Diagnoses and all orders for this visit:    1. Hypothyroidism, unspecified type (Primary)  -     TSH  -     T4, Free  -     Comprehensive Metabolic Panel  -     CBC & Differential  -     T3, free; Future    2. Hashimoto's disease  -     TSH  -     T4, Free  -     Comprehensive Metabolic Panel  -     CBC & Differential  -     T3, free; Future    Continue Synthroid.   Continue diet and exercise.  Keep follow-up with specialist.  I will see her back as needed basis.  I am assuming her endocrinologist will take over Synthroid.  Rest of her problems are chronic and stable.  EHR dragon/transcription disclaimer:  Part of this note are created by electronic transcription/translation of spoken language to printed text and thus may lead to erroneous, or at times, nonsensical words or phrases inadvertently transcribed.  Although I have reviewed for such errors, some may still exist.

## 2023-01-11 LAB
ALBUMIN SERPL-MCNC: 4.3 G/DL (ref 3.5–5.2)
ALBUMIN/GLOB SERPL: 1.7 G/DL
ALP SERPL-CCNC: 91 U/L (ref 39–117)
ALT SERPL-CCNC: 17 U/L (ref 1–33)
AST SERPL-CCNC: 18 U/L (ref 1–32)
BASOPHILS # BLD AUTO: 0 10*3/MM3 (ref 0–0.2)
BASOPHILS NFR BLD AUTO: 0 % (ref 0–1.5)
BILIRUB SERPL-MCNC: <0.2 MG/DL (ref 0–1.2)
BUN SERPL-MCNC: 8 MG/DL (ref 6–20)
BUN/CREAT SERPL: 14.5 (ref 7–25)
CALCIUM SERPL-MCNC: 8.9 MG/DL (ref 8.6–10.5)
CHLORIDE SERPL-SCNC: 103 MMOL/L (ref 98–107)
CO2 SERPL-SCNC: 25.7 MMOL/L (ref 22–29)
CREAT SERPL-MCNC: 0.55 MG/DL (ref 0.57–1)
EGFRCR SERPLBLD CKD-EPI 2021: 120.5 ML/MIN/1.73
EOSINOPHIL # BLD AUTO: 0 10*3/MM3 (ref 0–0.4)
EOSINOPHIL NFR BLD AUTO: 0 % (ref 0.3–6.2)
ERYTHROCYTE [DISTWIDTH] IN BLOOD BY AUTOMATED COUNT: 12.2 % (ref 12.3–15.4)
GLOBULIN SER CALC-MCNC: 2.6 GM/DL
GLUCOSE SERPL-MCNC: 94 MG/DL (ref 65–99)
HCT VFR BLD AUTO: 38.6 % (ref 34–46.6)
HGB BLD-MCNC: 12.3 G/DL (ref 12–15.9)
IMM GRANULOCYTES # BLD AUTO: 0.02 10*3/MM3 (ref 0–0.05)
IMM GRANULOCYTES NFR BLD AUTO: 0.3 % (ref 0–0.5)
LYMPHOCYTES # BLD AUTO: 1.99 10*3/MM3 (ref 0.7–3.1)
LYMPHOCYTES NFR BLD AUTO: 28.6 % (ref 19.6–45.3)
MCH RBC QN AUTO: 28.3 PG (ref 26.6–33)
MCHC RBC AUTO-ENTMCNC: 31.9 G/DL (ref 31.5–35.7)
MCV RBC AUTO: 88.9 FL (ref 79–97)
MONOCYTES # BLD AUTO: 0.57 10*3/MM3 (ref 0.1–0.9)
MONOCYTES NFR BLD AUTO: 8.2 % (ref 5–12)
NEUTROPHILS # BLD AUTO: 4.39 10*3/MM3 (ref 1.7–7)
NEUTROPHILS NFR BLD AUTO: 62.9 % (ref 42.7–76)
NRBC BLD AUTO-RTO: 0 /100 WBC (ref 0–0.2)
PLATELET # BLD AUTO: 281 10*3/MM3 (ref 140–450)
POTASSIUM SERPL-SCNC: 4.5 MMOL/L (ref 3.5–5.2)
PROT SERPL-MCNC: 6.9 G/DL (ref 6–8.5)
RBC # BLD AUTO: 4.34 10*6/MM3 (ref 3.77–5.28)
SODIUM SERPL-SCNC: 139 MMOL/L (ref 136–145)
T4 FREE SERPL-MCNC: 1.11 NG/DL (ref 0.93–1.7)
TSH SERPL DL<=0.005 MIU/L-ACNC: 3.17 UIU/ML (ref 0.27–4.2)
WBC # BLD AUTO: 6.97 10*3/MM3 (ref 3.4–10.8)

## 2023-01-12 ENCOUNTER — OFFICE VISIT (OUTPATIENT)
Dept: NEUROLOGY | Facility: CLINIC | Age: 38
End: 2023-01-12
Payer: COMMERCIAL

## 2023-01-12 VITALS
HEART RATE: 83 BPM | HEIGHT: 68 IN | BODY MASS INDEX: 32.71 KG/M2 | WEIGHT: 215.8 LBS | SYSTOLIC BLOOD PRESSURE: 110 MMHG | OXYGEN SATURATION: 100 % | DIASTOLIC BLOOD PRESSURE: 70 MMHG

## 2023-01-12 DIAGNOSIS — G43.019 INTRACTABLE MIGRAINE WITHOUT AURA AND WITHOUT STATUS MIGRAINOSUS: Primary | ICD-10-CM

## 2023-01-12 PROCEDURE — 99213 OFFICE O/P EST LOW 20 MIN: CPT | Performed by: NURSE PRACTITIONER

## 2023-01-12 RX ORDER — ERENUMAB-AOOE 70 MG/ML
70 INJECTION SUBCUTANEOUS
Qty: 1 ML | Refills: 11 | Status: SHIPPED | OUTPATIENT
Start: 2023-01-12

## 2023-01-12 RX ORDER — RIMEGEPANT SULFATE 75 MG/75MG
1 TABLET, ORALLY DISINTEGRATING ORAL DAILY PRN
Qty: 16 TABLET | Refills: 5 | Status: SHIPPED | OUTPATIENT
Start: 2023-01-12

## 2023-01-12 NOTE — PROGRESS NOTES
DOS: 2023  NAME: Corby Huber   : 1985  PCP: Sandra Prasad MD    Chief Complaint   Patient presents with   • Migraine      SUBJECTIVE  Neurological Problem:  37 y.o. female presenting for follow up for migraine headaches. Has been taking Aimovig 70 mg every 30 days. This works well for her. She was prescribed Nurtec 75 mg as needed. This does work well but she has had some trouble getting it from her pharmacy. No longer taking topiramate.     Was prescribed Emgality but her insurance would not cover the medication so she was changed to Aimovig.     Interval History:     Review of Systems:Review of Systems   Constitutional: Negative for fatigue and unexpected weight change.   HENT: Negative for ear pain and nosebleeds.    Eyes: Negative for photophobia, pain and visual disturbance.   Respiratory: Negative for chest tightness, shortness of breath, wheezing and stridor.    Cardiovascular: Negative for chest pain and palpitations.   Musculoskeletal: Negative for gait problem, neck pain and neck stiffness.   Neurological: Positive for headaches. Negative for dizziness, syncope, weakness and light-headedness.   Psychiatric/Behavioral: Positive for decreased concentration. Negative for confusion and sleep disturbance. The patient is not nervous/anxious.     Above ROS reviewed    The following portions of the patient's history were reviewed and updated as appropriate: allergies, current medications, past family history, past medical history, past social history, past surgical history and problem list.    Current Medications:   Current Outpatient Medications:   •  Aimovig 70 MG/ML auto-injector, INJECT 1ML UNDER THE SKIN INTO THE APPRORIATE AREA AS DIRECTED EVERY 30 DAYS, Disp: 1 mL, Rfl: 2  •  diphenhydrAMINE (BENADRYL) 25 mg capsule, Take 25 mg by mouth As Needed., Disp: , Rfl:   •  Ibuprofen (ADVIL MIGRAINE PO), Take 1 tablet by mouth Daily As Needed., Disp: , Rfl:   •  levothyroxine (Synthroid) 150 MCG  tablet, Take 1 tablet by mouth Daily. (Patient taking differently: Take 137 mcg by mouth Daily.), Disp: 90 tablet, Rfl: 3  •  multivitamin (THERAGRAN) tablet tablet, Take  by mouth Daily., Disp: , Rfl:   •  Rimegepant Sulfate (Nurtec) 75 MG tablet dispersible tablet, Take 1 tablet by mouth Daily As Needed (migraine)., Disp: 16 tablet, Rfl: 5  •  Butalbital-APAP-Caff-Cod -31-30 MG capsule, Take 1 tablet by mouth 3 (Three) Times a Day As Needed (headache)., Disp: 30 capsule, Rfl: 0  •  promethazine (PHENERGAN) 50 MG tablet, Take 0.5 tablets by mouth Every 6 (Six) Hours As Needed for Nausea or Vomiting., Disp: 12 tablet, Rfl: 1  •  SUMAtriptan (Imitrex) 50 MG tablet, Take one tablet at onset of headache. May repeat dose one time in 2 hours if headache not relieved., Disp: 10 tablet, Rfl: 3  •  Synthroid 137 MCG tablet, Take 137 mcg by mouth Daily., Disp: , Rfl:   •  topiramate (Topamax) 25 MG tablet, Take 2 tablets by mouth Every Night., Disp: 60 tablet, Rfl: 5  •  white petrolatum-dilTIAZem, Apply to area at least 3 times per day, Disp: 30 g, Rfl: 3  **I did not stop or change the above medications.  Patient's medication list was updated to reflect medications they have reported as currently taking, including medication changes made by other providers.    OBJECTIVE  Vitals:    01/12/23 1125   BP: 110/70   Pulse: 83   SpO2: 100%     Body mass index is 32.81 kg/m².    Diagnostics:    Laboratory Results:         Lab Results   Component Value Date    WBC 6.97 01/10/2023    HGB 12.3 01/10/2023    HCT 38.6 01/10/2023    MCV 88.9 01/10/2023     01/10/2023     Lab Results   Component Value Date    GLUCOSE 94 01/10/2023    BUN 8 01/10/2023    CREATININE 0.55 (L) 01/10/2023    EGFRIFNONA 87 09/08/2021    EGFRIFAFRI 106 09/08/2021    BCR 14.5 01/10/2023    K 4.5 01/10/2023    CO2 25.7 01/10/2023    CALCIUM 8.9 01/10/2023    PROTENTOTREF 6.9 01/10/2023    ALBUMIN 4.3 01/10/2023    LABIL2 1.7 01/10/2023    AST 18  01/10/2023    ALT 17 01/10/2023     No results found for: HGBA1C  No results found for: CHOL  Lab Results   Component Value Date    HDL 48 10/17/2022    HDL 49 04/21/2022     Lab Results   Component Value Date     (H) 10/17/2022     (H) 04/21/2022     Lab Results   Component Value Date    TRIG 333 (H) 10/17/2022    TRIG 187 (H) 04/21/2022     No results found for: RPR  Lab Results   Component Value Date    TSH 3.170 01/10/2023     Lab Results   Component Value Date    [id number] 649 04/21/2022       Physical Exam:  GENERAL: NAD  HEENT: Normocephalic, atraumatic   COR: RRR  Resp: Even and unlabored  Extremities: No signs of distal embolization.   Skin: No rashes, lesions or ulcers.  Psychiatric: Normal mood and affect.    Neurological:   MS: AO. Language normal. No neglect. Follows all commands.  CN: II-XII grossly normal  Motor: Normal strength and tone throughout.  Sensory: Intact to light touch in arms and legs  Station and Gait: Normal gait and station.    Coordination: Normal finger to nose bilaterally    Impression:      Plan:         Diagnoses and all orders for this visit:    1. Intractable migraine without aura and without status migrainosus (Primary)    Other orders  -     Rimegepant Sulfate (Nurtec) 75 MG tablet dispersible tablet; Take 1 tablet by mouth Daily As Needed (migraine).  Dispense: 16 tablet; Refill: 5  -     Erenumab-aooe (Aimovig) 70 MG/ML auto-injector; Inject 1 mL under the skin into the appropriate area as directed Every 30 (Thirty) Days.  Dispense: 1 mL; Refill: 11    Doing well. Will continue Aimovig 70 mg every 30 days. Sent Nurtec to [facility]. Hopefully this will help alleviate any issues with the pharmacy. She will call with any problems.     F/u 1 year.     Coding      Dictated using Dragon

## 2023-01-12 NOTE — LETTER
2023     Sandra Prasad MD  [address]  [place] [address]    Patient: Corby Huber   YOB: 1985   Date of Visit: 2023       Dear Dr. Osmar MD:    Thank you for referring Corby Huber to me for evaluation. Below are the relevant portions of my assessment and plan of care.    If you have questions, please do not hesitate to call me. I look forward to following Corby along with you.         Sincerely,        ONESIMO White        CC: No Recipients  Baron Espinoza APRN  [id number]  Signed  DOS: 2023  NAME: Corby Huber   : 1985  PCP: Sandra Prasad MD    Chief Complaint   Patient presents with   • Migraine      SUBJECTIVE  Neurological Problem:  37 y.o. female presenting for follow up for migraine headaches. Has been taking Aimovig 70 mg every 30 days. This works well for her. She was prescribed Nurtec 75 mg as needed. This does work well but she has had some trouble getting it from her pharmacy. No longer taking topiramate.     Was prescribed Emgality but her insurance would not cover the medication so she was changed to Aimovig.     Interval History:     Review of Systems:Review of Systems   Constitutional: Negative for fatigue and unexpected weight change.   HENT: Negative for ear pain and nosebleeds.    Eyes: Negative for photophobia, pain and visual disturbance.   Respiratory: Negative for chest tightness, shortness of breath, wheezing and stridor.    Cardiovascular: Negative for chest pain and palpitations.   Musculoskeletal: Negative for gait problem, neck pain and neck stiffness.   Neurological: Positive for headaches. Negative for dizziness, syncope, weakness and light-headedness.   Psychiatric/Behavioral: Positive for decreased concentration. Negative for confusion and sleep disturbance. The patient is not nervous/anxious.     Above ROS reviewed    The following portions of the patient's history were reviewed and updated as appropriate:  allergies, current medications, past family history, past medical history, past social history, past surgical history and problem list.    Current Medications:   Current Outpatient Medications:   •  Aimovig 70 MG/ML auto-injector, INJECT 1ML UNDER THE SKIN INTO THE APPRORIATE AREA AS DIRECTED EVERY 30 DAYS, Disp: 1 mL, Rfl: 2  •  diphenhydrAMINE (BENADRYL) 25 mg capsule, Take 25 mg by mouth As Needed., Disp: , Rfl:   •  Ibuprofen (ADVIL MIGRAINE PO), Take 1 tablet by mouth Daily As Needed., Disp: , Rfl:   •  levothyroxine (Synthroid) 150 MCG tablet, Take 1 tablet by mouth Daily. (Patient taking differently: Take 137 mcg by mouth Daily.), Disp: 90 tablet, Rfl: 3  •  multivitamin (THERAGRAN) tablet tablet, Take  by mouth Daily., Disp: , Rfl:   •  Rimegepant Sulfate (Nurtec) 75 MG tablet dispersible tablet, Take 1 tablet by mouth Daily As Needed (migraine)., Disp: 16 tablet, Rfl: 5  •  Butalbital-APAP-Caff-Cod -01-30 MG capsule, Take 1 tablet by mouth 3 (Three) Times a Day As Needed (headache)., Disp: 30 capsule, Rfl: 0  •  promethazine (PHENERGAN) 50 MG tablet, Take 0.5 tablets by mouth Every 6 (Six) Hours As Needed for Nausea or Vomiting., Disp: 12 tablet, Rfl: 1  •  SUMAtriptan (Imitrex) 50 MG tablet, Take one tablet at onset of headache. May repeat dose one time in 2 hours if headache not relieved., Disp: 10 tablet, Rfl: 3  •  Synthroid 137 MCG tablet, Take 137 mcg by mouth Daily., Disp: , Rfl:   •  topiramate (Topamax) 25 MG tablet, Take 2 tablets by mouth Every Night., Disp: 60 tablet, Rfl: 5  •  white petrolatum-dilTIAZem, Apply to area at least 3 times per day, Disp: 30 g, Rfl: 3  **I did not stop or change the above medications.  Patient's medication list was updated to reflect medications they have reported as currently taking, including medication changes made by other providers.    OBJECTIVE  Vitals:    01/12/23 1125   BP: 110/70   Pulse: 83   SpO2: 100%     Body mass index is 32.81  kg/m².    Diagnostics:    Laboratory Results:         Lab Results   Component Value Date    WBC 6.97 01/10/2023    HGB 12.3 01/10/2023    HCT 38.6 01/10/2023    MCV 88.9 01/10/2023     01/10/2023     Lab Results   Component Value Date    GLUCOSE 94 01/10/2023    BUN 8 01/10/2023    CREATININE 0.55 (L) 01/10/2023    EGFRIFNONA 87 09/08/2021    EGFRIFAFRI 106 09/08/2021    BCR 14.5 01/10/2023    K 4.5 01/10/2023    CO2 25.7 01/10/2023    CALCIUM 8.9 01/10/2023    PROTENTOTREF 6.9 01/10/2023    ALBUMIN 4.3 01/10/2023    LABIL2 1.7 01/10/2023    AST 18 01/10/2023    ALT 17 01/10/2023     No results found for: HGBA1C  No results found for: CHOL  Lab Results   Component Value Date    HDL 48 10/17/2022    HDL 49 04/21/2022     Lab Results   Component Value Date     (H) 10/17/2022     (H) 04/21/2022     Lab Results   Component Value Date    TRIG 333 (H) 10/17/2022    TRIG 187 (H) 04/21/2022     No results found for: RPR  Lab Results   Component Value Date    TSH 3.170 01/10/2023     Lab Results   Component Value Date    [id number] 649 04/21/2022       Physical Exam:  GENERAL: NAD  HEENT: Normocephalic, atraumatic   COR: RRR  Resp: Even and unlabored  Extremities: No signs of distal embolization.   Skin: No rashes, lesions or ulcers.  Psychiatric: Normal mood and affect.    Neurological:   MS: AO. Language normal. No neglect. Follows all commands.  CN: II-XII grossly normal  Motor: Normal strength and tone throughout.  Sensory: Intact to light touch in arms and legs  Station and Gait: Normal gait and station.    Coordination: Normal finger to nose bilaterally    Impression:      Plan:         Diagnoses and all orders for this visit:    1. Intractable migraine without aura and without status migrainosus (Primary)    Other orders  -     Rimegepant Sulfate (Nurtec) 75 MG tablet dispersible tablet; Take 1 tablet by mouth Daily As Needed (migraine).  Dispense: 16 tablet; Refill: 5  -     Erenumab-aooe  (Aimovig) 70 MG/ML auto-injector; Inject 1 mL under the skin into the appropriate area as directed Every 30 (Thirty) Days.  Dispense: 1 mL; Refill: 11    Doing well. Will continue Aimovig 70 mg every 30 days. [facility] Hopefully this will help alleviate any issues with the pharmacy. She will call with any problems.     F/u 1 year.     Coding      Dictated using Dragon

## 2023-02-08 NOTE — TELEPHONE ENCOUNTER
Patient was discharge today and wants to know if  antibiotics was prescribe. If so, she would like to be send to [facility] at [place].  Is in her chart   Please advise     Anesthesia Type: 1% lidocaine with epinephrine

## 2023-05-11 ENCOUNTER — SPECIALTY PHARMACY (OUTPATIENT)
Dept: NEUROLOGY | Facility: CLINIC | Age: 38
End: 2023-05-11
Payer: COMMERCIAL

## 2023-05-22 ENCOUNTER — DOCUMENTATION (OUTPATIENT)
Dept: NEUROLOGY | Facility: CLINIC | Age: 38
End: 2023-05-22
Payer: COMMERCIAL

## 2023-05-23 ENCOUNTER — TELEPHONE (OUTPATIENT)
Dept: NEUROLOGY | Facility: CLINIC | Age: 38
End: 2023-05-23
Payer: COMMERCIAL

## 2023-05-23 NOTE — TELEPHONE ENCOUNTER
Dr. Rodríguez called to do a peer to peer regarding patient Nurtec medication.     Dr. Rodríguez [phone].

## 2023-06-01 ENCOUNTER — SPECIALTY PHARMACY (OUTPATIENT)
Dept: NEUROLOGY | Facility: CLINIC | Age: 38
End: 2023-06-01

## 2023-06-01 RX ORDER — RIZATRIPTAN BENZOATE 10 MG/1
10 TABLET ORAL ONCE AS NEEDED
Qty: 12 TABLET | Refills: 2 | Status: SHIPPED | OUTPATIENT
Start: 2023-06-01 | End: 2024-05-31

## 2023-06-14 ENCOUNTER — SPECIALTY PHARMACY (OUTPATIENT)
Dept: NEUROLOGY | Facility: CLINIC | Age: 38
End: 2023-06-14
Payer: COMMERCIAL

## 2023-07-24 ENCOUNTER — SPECIALTY PHARMACY (OUTPATIENT)
Dept: NEUROLOGY | Facility: CLINIC | Age: 38
End: 2023-07-24
Payer: COMMERCIAL

## 2023-07-24 NOTE — PROGRESS NOTES
Specialty Pharmacy Patient Management Program  Call Center Refill Outreach      Corby is a 37 y.o. female contacted today regarding refills of her medication(s).    Specialty medication(s) and dose(s) confirmed: Aimovig 70mg/ml      Refill Questions      Flowsheet Row Most Recent Value   Changes to allergies? No   Changes to medications? No   New conditions since last clinic visit No   Unplanned office visit, urgent care, ED, or hospital admission in the last 4 weeks  No   How does patient/caregiver feel medication is working? Very good   Financial problems or insurance changes  No   Since the previous refill, were any specialty medication doses or scheduled injections missed or delayed?  No   Does this patient require a clinical escalation to a pharmacist? No                       Follow-up: 21 Days     Loreta Montgomery CPhT  Care Coordinator  [facility]  [phone]  7/24/2023  09:30 EDT

## 2023-08-18 ENCOUNTER — SPECIALTY PHARMACY (OUTPATIENT)
Dept: NEUROLOGY | Facility: CLINIC | Age: 38
End: 2023-08-18
Payer: COMMERCIAL

## 2023-08-18 NOTE — PROGRESS NOTES
Specialty Pharmacy Patient Management Program  Call Center Refill Outreach      Corby is a 37 y.o. female contacted today regarding refills of her medication(s).    Specialty medication(s) and dose(s) confirmed: Aimovig 70mg/ml      Refill Questions      Flowsheet Row Most Recent Value   Changes to allergies? No   Changes to medications? No   New conditions since last clinic visit No   Unplanned office visit, urgent care, ED, or hospital admission in the last 4 weeks  No   How does patient/caregiver feel medication is working? Very good   Financial problems or insurance changes  No   Since the previous refill, were any specialty medication doses or scheduled injections missed or delayed?  No   Does this patient require a clinical escalation to a pharmacist? No                       Follow-up: 21 Days     Loreta Montgomery CPhT  Care Coordinator  [facility]  [phone]  8/18/2023  11:46 EDT

## 2023-09-07 ENCOUNTER — SPECIALTY PHARMACY (OUTPATIENT)
Dept: NEUROLOGY | Facility: CLINIC | Age: 38
End: 2023-09-07
Payer: COMMERCIAL

## 2023-09-07 RX ORDER — RIZATRIPTAN BENZOATE 10 MG/1
10 TABLET ORAL ONCE AS NEEDED
Qty: 12 TABLET | Refills: 2 | Status: SHIPPED | OUTPATIENT
Start: 2023-09-07

## 2023-09-08 ENCOUNTER — TELEMEDICINE (OUTPATIENT)
Dept: NEUROLOGY | Facility: CLINIC | Age: 38
End: 2023-09-08
Payer: COMMERCIAL

## 2023-09-08 ENCOUNTER — CLINICAL SUPPORT (OUTPATIENT)
Dept: NEUROLOGY | Facility: CLINIC | Age: 38
End: 2023-09-08
Payer: COMMERCIAL

## 2023-09-08 DIAGNOSIS — G43.019 INTRACTABLE MIGRAINE WITHOUT AURA AND WITHOUT STATUS MIGRAINOSUS: Primary | ICD-10-CM

## 2023-09-08 PROCEDURE — 96372 THER/PROPH/DIAG INJ SC/IM: CPT | Performed by: NURSE PRACTITIONER

## 2023-09-08 PROCEDURE — 99213 OFFICE O/P EST LOW 20 MIN: CPT | Performed by: NURSE PRACTITIONER

## 2023-09-08 RX ORDER — KETOROLAC TROMETHAMINE 30 MG/ML
30 INJECTION, SOLUTION INTRAMUSCULAR; INTRAVENOUS EVERY 6 HOURS PRN
Status: SHIPPED | OUTPATIENT
Start: 2023-09-08 | End: 2023-09-13

## 2023-09-08 RX ORDER — KETOROLAC TROMETHAMINE 30 MG/ML
30 INJECTION, SOLUTION INTRAMUSCULAR; INTRAVENOUS EVERY 6 HOURS PRN
Status: DISCONTINUED | OUTPATIENT
Start: 2023-09-08 | End: 2023-09-08

## 2023-09-08 RX ADMIN — KETOROLAC TROMETHAMINE 30 MG: 30 INJECTION, SOLUTION INTRAMUSCULAR; INTRAVENOUS at 12:55

## 2023-09-12 ENCOUNTER — SPECIALTY PHARMACY (OUTPATIENT)
Dept: NEUROLOGY | Facility: CLINIC | Age: 38
End: 2023-09-12
Payer: COMMERCIAL

## 2023-09-12 NOTE — PROGRESS NOTES
Subjective   Corby Huber is a 37 y.o. female presenting for follow up. She has a history of migraine headaches. They were previously well controlled with Aimovig 70 mg every 30 days but over the past several weeks they have been worsening. She is here today to discuss possible changes to help with this. She is using Nurtec 75 mg as needed.      History of Present Illness     Review of Systems    I have reviewed and confirmed the accuracy of the patient's history: Chief complaint, HPI, ROS, Subjective, and Past Family Social History as entered by the MA/LPN/RN. Baron Kim Alexis, APRN 09/12/23      Objective     Physical Examination:  General Appearance:  Well developed, well nourished, well groomed, alert, and cooperative.      Neurological examination:   Higher Integrative Function: Oriented to time, place, and person. Normal registration, recall attention span and concentration. Normal language including comprehension, spontaneous speech, repetition, reading, writing, naming and vocabulary. No neglect with normal visual-spatial function and construction. Normal fund of knowledge and higher integrative function.     Limited exam due to this being a video visit.      Assessment & Plan   Diagnoses and all orders for this visit:    1. Intractable migraine without aura and without status migrainosus (Primary)         I am going to have her increase the Aimovig to 140 mg every 30 days. We will give this a couple of months and then see how she is doing. She is going to come  a sample of Trudhesa to try for the current headache. If this is helpful I will send a prescription. We discussed the option of Zavzpret as well should the Trudhesa not help. Risks, benefits, and SE of the medications were discussed.

## 2023-09-28 ENCOUNTER — SPECIALTY PHARMACY (OUTPATIENT)
Dept: NEUROLOGY | Facility: CLINIC | Age: 38
End: 2023-09-28
Payer: COMMERCIAL

## 2023-09-28 NOTE — PROGRESS NOTES
Specialty Pharmacy Patient Management Program  Call Center Refill Outreach      Corby is a 37 y.o. female contacted today regarding refills of her medication(s).    Specialty medication(s) and dose(s) confirmed: Aimovig 140mg/ml      Refill Questions      Flowsheet Row Most Recent Value   Changes to allergies? No   Changes to medications? No   New conditions since last clinic visit No   Unplanned office visit, urgent care, ED, or hospital admission in the last 4 weeks  No   How does patient/caregiver feel medication is working? Very good   Financial problems or insurance changes  No   Since the previous refill, were any specialty medication doses or scheduled injections missed or delayed?  No   Does this patient require a clinical escalation to a pharmacist? No                       Follow-up: 21 Days     Loreta Montgomery CPhT  Care Coordinator  [facility]  [phone]  9/28/2023  10:37 EDT

## 2023-10-23 ENCOUNTER — SPECIALTY PHARMACY (OUTPATIENT)
Dept: NEUROLOGY | Facility: CLINIC | Age: 38
End: 2023-10-23
Payer: COMMERCIAL

## 2023-10-25 NOTE — PROGRESS NOTES
Specialty Pharmacy Patient Management Program  Call Center Refill Outreach      Corby is a 37 y.o. female contacted today regarding refills of her medication(s).    Specialty medication(s) and dose(s) confirmed: Aimovig 140mg/ml      Refill Questions      Flowsheet Row Most Recent Value   Changes to allergies? No   Changes to medications? No   New conditions since last clinic visit No   Unplanned office visit, urgent care, ED, or hospital admission in the last 4 weeks  No   How does patient/caregiver feel medication is working? Very good   Financial problems or insurance changes  No   Since the previous refill, were any specialty medication doses or scheduled injections missed or delayed?  No   Does this patient require a clinical escalation to a pharmacist? No                       Follow-up: 21 Days     Loreta Montgomery CPhT  Care Coordinator  [facility]  [phone]  10/25/2023  09:16 EDT

## 2023-11-09 ENCOUNTER — OFFICE VISIT (OUTPATIENT)
Dept: NEUROLOGY | Facility: CLINIC | Age: 38
End: 2023-11-09
Payer: COMMERCIAL

## 2023-11-09 VITALS
WEIGHT: 215 LBS | HEIGHT: 68 IN | SYSTOLIC BLOOD PRESSURE: 110 MMHG | HEART RATE: 76 BPM | DIASTOLIC BLOOD PRESSURE: 70 MMHG | OXYGEN SATURATION: 97 % | BODY MASS INDEX: 32.58 KG/M2

## 2023-11-09 DIAGNOSIS — G43.019 INTRACTABLE MIGRAINE WITHOUT AURA AND WITHOUT STATUS MIGRAINOSUS: Primary | ICD-10-CM

## 2023-11-09 PROCEDURE — 99213 OFFICE O/P EST LOW 20 MIN: CPT | Performed by: NURSE PRACTITIONER

## 2023-11-09 RX ORDER — DIHYDROERGOTAMINE MESYLATE 4 MG/ML
2 SPRAY, METERED NASAL DAILY PRN
Qty: 4 ML | Refills: 5 | Status: SHIPPED | OUTPATIENT
Start: 2023-11-09

## 2023-11-09 NOTE — LETTER
November 9, 2023       No Recipients    Patient: Corby Huber   YOB: 1985   Date of Visit: 11/9/2023     Dear Sandra Prasad MD:       Thank you for referring Corby Huber to me for evaluation. Below are the relevant portions of my assessment and plan of care.    If you have questions, please do not hesitate to call me. I look forward to following Corby along with you.         Sincerely,        ONESIMO White        CC:   No Recipients    Baron Espinoza APRN  11/09/23 1317  Sign when Signing Visit  Subjective  Corby Huber is a 37 y.o. female presenting for follow-up.  She has a history of migraine headaches.  In September I increased her dose from 70 mg every 30 days to 140 mg.  This is worked much better for her.  She is also taking rizatriptan as needed.  She tried a sample of Trudhesa in September and this was very effective for her.     History of Present Illness     Review of Systems    I have reviewed and confirmed the accuracy of the patient's history: Chief complaint, HPI, ROS, Subjective, and Past Family Social History as entered by the MA/LPN/RN. Cassandra Brooks MA 11/09/23      Objective    Physical Examination:  General Appearance:  Well developed, well nourished, well groomed, alert, and cooperative.  HEENT: Normocephalic.    Neck and Spine: Normal range of motion.  Normal alignment. No mass or tenderness. No bruits.  Cardiac: Regular rate and rhythm. No murmurs.  Peripheral Vasculature: Radial and pedal pulses are equal and symmetric.   Extremities: No edema or deformities. Normal joint ROM.  Skin: No rashes or birth marks.      Neurological examination:   Higher Integrative Function: Oriented to time, place, and person. Normal registration, recall attention span and concentration. Normal language including comprehension, spontaneous speech, repetition, reading, writing, naming and vocabulary. No neglect with normal visual-spatial function and construction. Normal fund  of knowledge and higher integrative function.   CN II: Pupils are equal, round and reactive to light. Normal visual acuity and visual fields.   CN III IV VI: Extraocular movements are full without nystagmus.   CN V: Normal facial sensation and strength of muscles of mastication.   CN VII: Facial movements are symmetric. No weakness.   CN VIII: Auditory acuity is normal.  CN IX & X: Symmetric palatal movement.   CN XI: Sternocleidomastoid and trapezius are normal. No weakness.   CN XII: The tongue is midline. No atrophy or fasciculations.  Motor: Normal muscle strength, bulk and tone in upper and lower extremities. No fasciculations, rigidity, spasticity, or abnormal movements.   Reflexes: 2+ in the upper and lower extremities. Plantar responses are flexor.   Sensation: Normal light touch, pinprick, vibration, temperature, and proprioception in the arms and legs.   Station and Gait: Normal gait and station.   Coordination: Finger to nose test shows no dysmetria. Rapid alternating movements are normal. Heel to shin is normal.           Assessment & Plan  Diagnoses and all orders for this visit:    1. Intractable migraine without aura and without status migrainosus (Primary)    Other orders  -     Dihydroergotamine Mesylate HFA (Trudhesa) 0.725 MG/ACT aerosol solution; 2 sprays into the nostril(s) as directed by provider Daily As Needed (migraine).  Dispense: 4 mL; Refill: 5    She is doing much better on the increased dose of Aimovig.  She will continue this along with rizatriptan as needed.  I did send a new prescription for Trudhesa. Instructed not to use this within 24 hours of rizatriptan. Side effects reviewed. She will call with any problems.     F/u 1 year.

## 2023-11-09 NOTE — PROGRESS NOTES
Subjective   Corby Huber is a 37 y.o. female presenting for follow-up.  She has a history of migraine headaches.  In September I increased her dose from 70 mg every 30 days to 140 mg.  This is worked much better for her.  She is also taking rizatriptan as needed.  She tried a sample of Trudhesa in September and this was very effective for her.     History of Present Illness     Review of Systems    I have reviewed and confirmed the accuracy of the patient's history: Chief complaint, HPI, ROS, Subjective, and Past Family Social History as entered by the MA/LPN/RN. Cassandra LULU Brooks 11/09/23      Objective     Physical Examination:  General Appearance:  Well developed, well nourished, well groomed, alert, and cooperative.  HEENT: Normocephalic.    Neck and Spine: Normal range of motion.  Normal alignment. No mass or tenderness. No bruits.  Cardiac: Regular rate and rhythm. No murmurs.  Peripheral Vasculature: Radial and pedal pulses are equal and symmetric.   Extremities: No edema or deformities. Normal joint ROM.  Skin: No rashes or birth marks.      Neurological examination:   Higher Integrative Function: Oriented to time, place, and person. Normal registration, recall attention span and concentration. Normal language including comprehension, spontaneous speech, repetition, reading, writing, naming and vocabulary. No neglect with normal visual-spatial function and construction. Normal fund of knowledge and higher integrative function.   CN II: Pupils are equal, round and reactive to light. Normal visual acuity and visual fields.   CN III IV VI: Extraocular movements are full without nystagmus.   CN V: Normal facial sensation and strength of muscles of mastication.   CN VII: Facial movements are symmetric. No weakness.   CN VIII: Auditory acuity is normal.  CN IX & X: Symmetric palatal movement.   CN XI: Sternocleidomastoid and trapezius are normal. No weakness.   CN XII: The tongue is midline. No atrophy or  fasciculations.  Motor: Normal muscle strength, bulk and tone in upper and lower extremities. No fasciculations, rigidity, spasticity, or abnormal movements.   Reflexes: 2+ in the upper and lower extremities. Plantar responses are flexor.   Sensation: Normal light touch, pinprick, vibration, temperature, and proprioception in the arms and legs.   Station and Gait: Normal gait and station.   Coordination: Finger to nose test shows no dysmetria. Rapid alternating movements are normal. Heel to shin is normal.           Assessment & Plan   Diagnoses and all orders for this visit:    1. Intractable migraine without aura and without status migrainosus (Primary)    Other orders  -     Dihydroergotamine Mesylate HFA (Trudhesa) 0.725 MG/ACT aerosol solution; 2 sprays into the nostril(s) as directed by provider Daily As Needed (migraine).  Dispense: 4 mL; Refill: 5    She is doing much better on the increased dose of Aimovig.  She will continue this along with rizatriptan as needed.  I did send a new prescription for Trudhesa. Instructed not to use this within 24 hours of rizatriptan. Side effects reviewed. She will call with any problems.     F/u 1 year.

## 2023-11-17 ENCOUNTER — SPECIALTY PHARMACY (OUTPATIENT)
Dept: NEUROLOGY | Facility: CLINIC | Age: 38
End: 2023-11-17
Payer: COMMERCIAL

## 2023-11-17 NOTE — PROGRESS NOTES
Specialty Pharmacy Refill Coordination Note     Corby is a 37 y.o. female contacted today regarding refills of her specialty medication(s).    Specialty medication(s) and dose(s) confirmed: erenumab (Aimovig) 140 mg subcutaneous every 30 days  Changes to medications: no  Changes to insurance: no  Reviewed and verified with patient:  Allergies  Meds  Problems         Refill Questions      Flowsheet Row Most Recent Value   Changes to allergies? No   Changes to medications? No   New conditions since last clinic visit No   Unplanned office visit, urgent care, ED, or hospital admission in the last 4 weeks  No   How does patient/caregiver feel medication is working? Very good   Financial problems or insurance changes  No   Since the previous refill, were any specialty medication doses or scheduled injections missed or delayed?  No   Does this patient require a clinical escalation to a pharmacist? No                     Follow-up: 3 weeks     Timo Puentes RPH  11/17/2023   15:37 EST

## 2023-12-15 ENCOUNTER — SPECIALTY PHARMACY (OUTPATIENT)
Dept: NEUROLOGY | Facility: CLINIC | Age: 38
End: 2023-12-15
Payer: COMMERCIAL

## 2023-12-15 NOTE — PROGRESS NOTES
Specialty Pharmacy Patient Management Program  Call Center Refill Outreach      Corby is a 38 y.o. female contacted today regarding refills of her medication(s).    Specialty medication(s) and dose(s) confirmed: Aimovig 140mg/ml      Refill Questions      Flowsheet Row Most Recent Value   Changes to allergies? No   Changes to medications? No   New conditions or infections since last clinic visit No   Unplanned office visit, urgent care, ED, or hospital admission in the last 4 weeks  No   How does patient/caregiver feel medication is working? Very good   Financial problems or insurance changes  No   Since the previous refill, were any specialty medication doses or scheduled injections missed or delayed?  No   Does this patient require a clinical escalation to a pharmacist? No            Delivery Questions      Flowsheet Row Most Recent Value   Delivery method Beeline   Delivery address verified with patient/caregiver? Yes   Delivery address Home   Number of medications in delivery 1   Medication(s) being filled and delivered Erenumab-Aooe   Doses left of specialty medications 0   Copay verified? Yes   Copay amount $0   Copay form of payment No copayment ($0)                   Follow-up: 21 Days     Loreta Montgomery CPhT  Care Coordinator  [facility]  [phone]  12/15/2023  13:55 EST

## 2024-01-08 ENCOUNTER — SPECIALTY PHARMACY (OUTPATIENT)
Dept: NEUROLOGY | Facility: CLINIC | Age: 39
End: 2024-01-08
Payer: COMMERCIAL

## 2024-01-08 NOTE — PROGRESS NOTES
Specialty Pharmacy Patient Management Program  Neurology Reassessment     Corby Huber is a 38 y.o. female with chronic migraines seen by a Neurology provider and enrolled in the Neurology Patient Management program offered by [facility].  A follow-up outreach was conducted, including assessment of continued therapy appropriateness, medication adherence, and side effect incidence and management for erenumab (Aimovig).     Changes to Insurance Coverage or Financial Support  Rx [employer] and copay card       Relevant Past Medical History and Comorbidities  Relevant medical history and concomitant health conditions were discussed with the patient. The patient's chart has been reviewed for relevant past medical history and comorbid health conditions and updated as necessary.   Past Medical History:   Diagnosis Date    Environmental allergies     Fissure, anal     Hypothyroidism      Social History     Socioeconomic History    Marital status: Single   Tobacco Use    Smoking status: Never    Smokeless tobacco: Never   Vaping Use    Vaping Use: Never used   Substance and Sexual Activity    Alcohol use: Never    Drug use: Never    Sexual activity: Defer     Problem list reviewed by Timo Puentes RPH on 1/8/2024 at  2:07 PM      Hospitalizations and Urgent Care Since Last Assessment  ED Visits, Admissions, or Hospitalizations: None.   Urgent Office Visits: None.       Allergies  Known allergies and reactions were discussed with the patient. The patient's chart has been reviewed for allergy information and updated as necessary.   No Known Allergies  Allergies reviewed by Timo Puentes RPH on 1/8/2024 at  2:07 PM      Relevant Laboratory Values  Common labs          4/4/2023    11:49   Common Labs   WBC 4.38       Hemoglobin 13.1       Hematocrit 43.2       Platelets 248       Hemoglobin A1C 5.1          Details          This result is from an external source.               Lab  Assessment  The above labs have been reviewed. No dose adjustments are needed for the specialty medication(s) based on the labs.       Current Medication List  This medication list has been reviewed with the patient and evaluated for any interactions or necessary modifications/recommendations, and updated to include all prescription medications, OTC medications, and supplements the patient is currently taking.  This list reflects what is contained in the patient's profile, which has also been marked as reviewed to communicate to other providers it is the most up to date version of the patient's current medication therapy.     Current Outpatient Medications:     Butalbital-APAP-Caff-Cod -93-30 MG capsule, Take 1 tablet by mouth 3 (Three) Times a Day As Needed (headache). (Patient not taking: Reported on 11/9/2023), Disp: 30 capsule, Rfl: 0    Dihydroergotamine Mesylate HFA (Trudhesa) 0.725 MG/ACT aerosol solution, 2 sprays into the nostril(s) as directed by provider Daily As Needed (migraine)., Disp: 4 mL, Rfl: 5    diphenhydrAMINE (BENADRYL) 25 mg capsule, Take 1 capsule by mouth As Needed., Disp: , Rfl:     Erenumab-aooe (AIMOVIG) 140 MG/ML auto-injector, Inject 1 mL under the skin into the appropriate area as directed Every 30 (Thirty) Days., Disp: 1 mL, Rfl: 5    Ibuprofen (ADVIL MIGRAINE PO), Take 1 tablet by mouth Daily As Needed., Disp: , Rfl:     levothyroxine (Synthroid) 150 MCG tablet, Take 1 tablet by mouth Daily. (Patient taking differently: Take 137 mcg by mouth Daily.), Disp: 90 tablet, Rfl: 3    multivitamin (THERAGRAN) tablet tablet, Take  by mouth Daily., Disp: , Rfl:     promethazine (PHENERGAN) 50 MG tablet, Take 0.5 tablets by mouth Every 6 (Six) Hours As Needed for Nausea or Vomiting., Disp: 12 tablet, Rfl: 1    rizatriptan (Maxalt) 10 MG tablet, Take 1 tablet by mouth 1 (One) Time As Needed for Migraine. May repeat in 2 hours if needed. Max of 2 tablets in 24 hours., Disp: 12 tablet, Rfl:  2    Synthroid 137 MCG tablet, Take 1 tablet by mouth Daily., Disp: , Rfl:     topiramate (Topamax) 25 MG tablet, Take 2 tablets by mouth Every Night., Disp: 60 tablet, Rfl: 5    white petrolatum-dilTIAZem, Apply to area at least 3 times per day (Patient not taking: Reported on 11/9/2023), Disp: 30 g, Rfl: 3    Medicines reviewed by Timo Puentes [facility] on 1/8/2024 at  2:07 PM    Drug Interactions  None identified       Adverse Drug Reactions  Medication tolerability: Tolerating with no to minimal ADRs  Medication plan: Continue therapy with normal follow-up  Plan for ADR Management: N/A, no ADR's      Adherence, Self-Administration, and Current Therapy Problems  Adherence related patient's specialty therapy was discussed with the patient. The Adherence segment of this outreach has been reviewed and updated.   Adherence Questions  Linked Medication(s) Assessed: Erenumab-Aooe  On average, how many doses/injections does the patient miss per month?: 0  What are the identified reasons for non-adherence or missed doses? : no problems identified  What is the estimated medication adherence level?: %  Based on the patient/caregiver response and refill history, does this patient require an MTP to track adherence improvements?: no    Additional Barriers to Patient Self-Administration: No barriers  Methods for Supporting Patient Self-Administration: None needed at this time.    Recently Close Medication Therapy Problems  No medication therapy recommendations to display  Open Medication Therapy Problems  No medication therapy recommendations to display     Goals of Therapy  Goals related to the patient's specialty therapy was discussed with the patient. The Patient Goals segment of this outreach has been reviewed and updated.    Goals Addressed Today        Specialty Pharmacy General Goal      Reduce/maintain reduced frequency and severity of migraines. Currently with ~2-3 headache days per month that are mild in nature.      1/8/24 clinical reassessment: Patient reports the same 2-3 headache days per month on average. She did have a slight increase in headache days during December 2023 which she attributes to weather and the stress of the holidays. She anticipates that this will return to her normal over the next month but knows to call if it doesn't or if frequency or severity worsen over time.                Quality of Life Assessment   Quality of Life related to the patient's enrollment in the patient management program and services provided was discussed with the patient. The QOL segment of this outreach has been reviewed and updated.   Quality of Life Improvement Scale: 8-Moderately better      Reassessment Plan & Follow-Up  Medication Therapy Changes: No changes, continuing erenumab (Aimovig) for migraine prevention per patient's neurology provider.   Related Plans, Therapy Recommendations, or Issues to Be Addressed: Nothing further to be addressed at this time.   Pharmacist to perform regular reassessments no more than (6) months from the previous assessment.  Care Coordinator to set up future refill outreaches, coordinate prescription delivery, and escalate clinical questions to pharmacist.     Attestation     I attest the patient was actively involved in and has agreed to the above plan of care. If the prescribed therapy is at any point deemed not appropriate based on the current or future assessments, a consultation will be initiated with the patient's specialty care provider to determine the best course of action. The revised plan of therapy will be documented along with any additional patient education provided. Discussed aforementioned material with patient by phone.    Timo Puentes, Cynthia, [facility] Specialty Pharmacist, Neurology  1/8/2024  14:10 EST

## 2024-01-28 ENCOUNTER — APPOINTMENT (OUTPATIENT)
Dept: CT IMAGING | Facility: HOSPITAL | Age: 39
End: 2024-01-28
Payer: COMMERCIAL

## 2024-01-28 ENCOUNTER — HOSPITAL ENCOUNTER (EMERGENCY)
Facility: HOSPITAL | Age: 39
Discharge: HOME | End: 2024-01-28
Attending: EMERGENCY MEDICINE | Admitting: EMERGENCY MEDICINE
Payer: COMMERCIAL

## 2024-01-28 VITALS
TEMPERATURE: 97.7 F | RESPIRATION RATE: 17 BRPM | DIASTOLIC BLOOD PRESSURE: 66 MMHG | HEIGHT: 69 IN | HEART RATE: 57 BPM | OXYGEN SATURATION: 100 % | WEIGHT: 215 LBS | BODY MASS INDEX: 31.84 KG/M2 | SYSTOLIC BLOOD PRESSURE: 114 MMHG

## 2024-01-28 DIAGNOSIS — R51.9 NONINTRACTABLE HEADACHE, UNSPECIFIED CHRONICITY PATTERN, UNSPECIFIED HEADACHE TYPE: ICD-10-CM

## 2024-01-28 DIAGNOSIS — Z86.69 HISTORY OF MIGRAINE: Primary | ICD-10-CM

## 2024-01-28 LAB
ALBUMIN SERPL-MCNC: 4.7 G/DL (ref 3.5–5.2)
ALBUMIN/GLOB SERPL: 2 G/DL
ALP SERPL-CCNC: 80 U/L (ref 39–117)
ALT SERPL W P-5'-P-CCNC: 20 U/L (ref 1–33)
ANION GAP SERPL CALCULATED.3IONS-SCNC: 10.6 MMOL/L (ref 5–15)
AST SERPL-CCNC: 19 U/L (ref 1–32)
BASOPHILS # BLD AUTO: 0 10*3/MM3 (ref 0–0.2)
BASOPHILS NFR BLD AUTO: 0 % (ref 0–1.5)
BILIRUB SERPL-MCNC: 0.3 MG/DL (ref 0–1.2)
BUN SERPL-MCNC: 12 MG/DL (ref 6–20)
BUN/CREAT SERPL: 16.2 (ref 7–25)
CALCIUM SPEC-SCNC: 9.4 MG/DL (ref 8.6–10.5)
CHLORIDE SERPL-SCNC: 103 MMOL/L (ref 98–107)
CO2 SERPL-SCNC: 25.4 MMOL/L (ref 22–29)
CREAT SERPL-MCNC: 0.74 MG/DL (ref 0.57–1)
DEPRECATED RDW RBC AUTO: 45.4 FL (ref 37–54)
EGFRCR SERPLBLD CKD-EPI 2021: 105.7 ML/MIN/1.73
EOSINOPHIL # BLD AUTO: 0 10*3/MM3 (ref 0–0.4)
EOSINOPHIL NFR BLD AUTO: 0 % (ref 0.3–6.2)
ERYTHROCYTE [DISTWIDTH] IN BLOOD BY AUTOMATED COUNT: 13.3 % (ref 12.3–15.4)
FLUAV SUBTYP SPEC NAA+PROBE: NOT DETECTED
FLUBV RNA ISLT QL NAA+PROBE: NOT DETECTED
GLOBULIN UR ELPH-MCNC: 2.3 GM/DL
GLUCOSE SERPL-MCNC: 102 MG/DL (ref 65–99)
HCT VFR BLD AUTO: 44.7 % (ref 34–46.6)
HGB BLD-MCNC: 14.6 G/DL (ref 12–15.9)
IMM GRANULOCYTES # BLD AUTO: 0 10*3/MM3 (ref 0–0.05)
IMM GRANULOCYTES NFR BLD AUTO: 0 % (ref 0–0.5)
LYMPHOCYTES # BLD AUTO: 1.31 10*3/MM3 (ref 0.7–3.1)
LYMPHOCYTES NFR BLD AUTO: 32 % (ref 19.6–45.3)
MCH RBC QN AUTO: 30 PG (ref 26.6–33)
MCHC RBC AUTO-ENTMCNC: 32.7 G/DL (ref 31.5–35.7)
MCV RBC AUTO: 91.8 FL (ref 79–97)
MONOCYTES # BLD AUTO: 0.32 10*3/MM3 (ref 0.1–0.9)
MONOCYTES NFR BLD AUTO: 7.8 % (ref 5–12)
NEUTROPHILS NFR BLD AUTO: 2.46 10*3/MM3 (ref 1.7–7)
NEUTROPHILS NFR BLD AUTO: 60.2 % (ref 42.7–76)
PLATELET # BLD AUTO: 197 10*3/MM3 (ref 140–450)
PMV BLD AUTO: 9.3 FL (ref 6–12)
POTASSIUM SERPL-SCNC: 4.1 MMOL/L (ref 3.5–5.2)
PROT SERPL-MCNC: 7 G/DL (ref 6–8.5)
RBC # BLD AUTO: 4.87 10*6/MM3 (ref 3.77–5.28)
SARS-COV-2 RNA RESP QL NAA+PROBE: NOT DETECTED
SODIUM SERPL-SCNC: 139 MMOL/L (ref 136–145)
WBC NRBC COR # BLD AUTO: 4.09 10*3/MM3 (ref 3.4–10.8)

## 2024-01-28 PROCEDURE — 25010000002 DIPHENHYDRAMINE PER 50 MG: Performed by: EMERGENCY MEDICINE

## 2024-01-28 PROCEDURE — 80053 COMPREHEN METABOLIC PANEL: CPT | Performed by: EMERGENCY MEDICINE

## 2024-01-28 PROCEDURE — 70450 CT HEAD/BRAIN W/O DYE: CPT

## 2024-01-28 PROCEDURE — 85025 COMPLETE CBC W/AUTO DIFF WBC: CPT | Performed by: EMERGENCY MEDICINE

## 2024-01-28 PROCEDURE — 96374 THER/PROPH/DIAG INJ IV PUSH: CPT

## 2024-01-28 PROCEDURE — 25810000003 SODIUM CHLORIDE 0.9 % SOLUTION: Performed by: EMERGENCY MEDICINE

## 2024-01-28 PROCEDURE — 25010000002 METOCLOPRAMIDE PER 10 MG: Performed by: EMERGENCY MEDICINE

## 2024-01-28 PROCEDURE — 96375 TX/PRO/DX INJ NEW DRUG ADDON: CPT

## 2024-01-28 PROCEDURE — 99284 EMERGENCY DEPT VISIT MOD MDM: CPT

## 2024-01-28 PROCEDURE — 87636 SARSCOV2 & INF A&B AMP PRB: CPT | Performed by: EMERGENCY MEDICINE

## 2024-01-28 RX ORDER — DIPHENHYDRAMINE HCL 25 MG
25 TABLET ORAL EVERY 6 HOURS PRN
Qty: 10 TABLET | Refills: 0 | Status: SHIPPED | OUTPATIENT
Start: 2024-01-28 | End: 2024-01-28 | Stop reason: SDUPTHER

## 2024-01-28 RX ORDER — METOCLOPRAMIDE HYDROCHLORIDE 5 MG/ML
10 INJECTION INTRAMUSCULAR; INTRAVENOUS ONCE
Status: COMPLETED | OUTPATIENT
Start: 2024-01-28 | End: 2024-01-28

## 2024-01-28 RX ORDER — DIPHENHYDRAMINE HCL 25 MG
25 TABLET ORAL EVERY 6 HOURS PRN
Qty: 10 TABLET | Refills: 0 | Status: SHIPPED | OUTPATIENT
Start: 2024-01-28

## 2024-01-28 RX ORDER — ACETAMINOPHEN 325 MG/1
650 TABLET ORAL ONCE
Status: COMPLETED | OUTPATIENT
Start: 2024-01-28 | End: 2024-01-28

## 2024-01-28 RX ORDER — METOCLOPRAMIDE 10 MG/1
10 TABLET ORAL 3 TIMES DAILY PRN
Qty: 12 TABLET | Refills: 0 | Status: SHIPPED | OUTPATIENT
Start: 2024-01-28

## 2024-01-28 RX ORDER — DIPHENHYDRAMINE HYDROCHLORIDE 50 MG/ML
25 INJECTION INTRAMUSCULAR; INTRAVENOUS ONCE
Status: COMPLETED | OUTPATIENT
Start: 2024-01-28 | End: 2024-01-28

## 2024-01-28 RX ORDER — METOCLOPRAMIDE 10 MG/1
10 TABLET ORAL 3 TIMES DAILY PRN
Qty: 12 TABLET | Refills: 0 | Status: SHIPPED | OUTPATIENT
Start: 2024-01-28 | End: 2024-01-28 | Stop reason: SDUPTHER

## 2024-01-28 RX ADMIN — DIPHENHYDRAMINE HYDROCHLORIDE 25 MG: 50 INJECTION, SOLUTION INTRAMUSCULAR; INTRAVENOUS at 12:53

## 2024-01-28 RX ADMIN — METOCLOPRAMIDE 10 MG: 5 INJECTION, SOLUTION INTRAMUSCULAR; INTRAVENOUS at 12:55

## 2024-01-28 RX ADMIN — SODIUM CHLORIDE 500 ML: 9 INJECTION, SOLUTION INTRAVENOUS at 12:54

## 2024-01-28 RX ADMIN — ACETAMINOPHEN 650 MG: 325 TABLET, FILM COATED ORAL at 13:03

## 2024-01-28 NOTE — Clinical Note
[facility]  [address] [id number]    Corby Huber was seen and treated in our emergency department on 1/28/2024.  She may return to work on 01/31/2024.         Thank you for choosing [facility].    Lowell Thompson MD

## 2024-01-28 NOTE — Clinical Note
[facility]  [address] [address] [id number]    Corby Huber was seen and treated in our emergency department on 1/28/2024.  She may return to work on 01/31/2024.         Thank you for choosing [facility].    Lowell Thompson MD

## 2024-01-28 NOTE — FSED PROVIDER NOTE
Subjective   History of Present Illness  Patient here today with headache.  This started about 10 hours ago.  She does have a history of headaches and migraines.  She takes and has taken her typical headache/migraine medication prior to arrival.  This did not really seem to help much.  She had some visual disturbances several hours ago which have since resolved.  She does have some pain with eye movement and photophobia..  Denies any acute focal deficits.  There is no sensory or motor abnormalities.      Review of Systems    Past Medical History:   Diagnosis Date    Environmental allergies     Fissure, anal     Hypothyroidism     Migraines        No Known Allergies    Past Surgical History:   Procedure Laterality Date    APPENDECTOMY N/A 11/23/2022    Procedure: APPENDECTOMY LAPAROSCOPIC;  Surgeon: Mildred Munroe MD;  Location: [facility] OR;  Service: General;  Laterality: N/A;    FOOT SURGERY N/A 01/2022    HEMORRHOIDECTOMY  May 2010       Family History   Problem Relation Age of Onset    Liver disease Father     Diabetes Maternal Uncle     Heart disease Maternal Uncle     Hypertension Maternal Uncle     Arthritis Maternal Grandmother     Heart disease Maternal Grandmother     Hypertension Maternal Grandmother     Seizures Maternal Grandfather        Social History     Socioeconomic History    Marital status: Single   Tobacco Use    Smoking status: Never    Smokeless tobacco: Never   Vaping Use    Vaping Use: Never used   Substance and Sexual Activity    Alcohol use: Never    Drug use: Never    Sexual activity: Defer           Objective   Physical Exam  Vitals and nursing note reviewed.   Constitutional:       Appearance: Normal appearance.   HENT:      Head: Normocephalic.      Right Ear: External ear normal.      Left Ear: External ear normal.      Nose: Nose normal.   Eyes:      Conjunctiva/sclera: Conjunctivae normal.   Cardiovascular:      Rate and Rhythm: Normal rate.   Pulmonary:      Effort: Pulmonary  effort is normal.   Abdominal:      General: There is no distension.   Musculoskeletal:      Cervical back: Normal range of motion.   Skin:     Findings: No rash.   Neurological:      General: No focal deficit present.      Mental Status: She is alert and oriented to person, place, and time.      Sensory: No sensory deficit.      Motor: No weakness.   Psychiatric:      Comments: Flat affect  Appears uncomfortable         Procedures           ED Course  ED Course as of 01/28/24 1336   Sun Jan 28, 2024   1332 Patient is currently feeling better.  She states that the cocktail given her here today markedly improved her symptoms.  She is feeling much better although somewhat sleepy.  She should follow-up with her outpatient team including neurologist or headache specialist [FK]      ED Course User Index  [FK] Lowell Thompson MD                                           Medical Decision Making  See ED course.    Patient feeling better overall.  Workup negative.    Problems Addressed:  History of migraine: complicated acute illness or injury  Nonintractable headache, unspecified chronicity pattern, unspecified headache type: complicated acute illness or injury    Amount and/or Complexity of Data Reviewed  Labs: ordered.  Radiology: ordered.    Risk  OTC drugs.  Prescription drug management.        Final diagnoses:   History of migraine   Nonintractable headache, unspecified chronicity pattern, unspecified headache type       ED Disposition  ED Disposition       ED Disposition   Discharge    Condition   Stable    Comment   --               Sandra Prasad MD  [address] [facility] [address] [phone]               Medication List        New Prescriptions      metoclopramide 10 MG tablet  Commonly known as: REGLAN  Take 1 tablet by mouth 3 (Three) Times a Day As Needed (Headache).            Changed      * diphenhydrAMINE 25 mg capsule  Commonly known as: BENADRYL  What changed: Another medication with the  same name was added. Make sure you understand how and when to take each.     * diphenhydrAMINE 25 MG tablet  Commonly known as: BENADRYL  Take 1 tablet by mouth Every 6 (Six) Hours As Needed (headache).  What changed: You were already taking a medication with the same name, and this prescription was added. Make sure you understand how and when to take each.     * levothyroxine 150 MCG tablet  Commonly known as: Synthroid  Take 1 tablet by mouth Daily.  What changed: how much to take     * Synthroid 137 MCG tablet  Generic drug: levothyroxine  What changed: Another medication with the same name was changed. Make sure you understand how and when to take each.           * This list has 4 medication(s) that are the same as other medications prescribed for you. Read the directions carefully, and ask your doctor or other care provider to review them with you.                   Where to Get Your Medications        These medications were sent to [facility] - [place], [place] - [facility] [place] [address] - [phone]  - [phone] [place] [address] [address] [place] [address]      Phone: [phone]   diphenhydrAMINE 25 MG tablet  metoclopramide 10 MG tablet

## 2024-02-05 ENCOUNTER — SPECIALTY PHARMACY (OUTPATIENT)
Dept: NEUROLOGY | Facility: CLINIC | Age: 39
End: 2024-02-05
Payer: COMMERCIAL

## 2024-02-05 NOTE — PROGRESS NOTES
Specialty Pharmacy Refill Coordination Note     Corby is a 38 y.o. female contacted today regarding refills of  Aimovig specialty medication(s).    Reviewed and verified with patient:       Specialty medication(s) and dose(s) confirmed: yes    Refill Questions      Flowsheet Row Most Recent Value   Changes to allergies? No   Changes to medications? No   New conditions or infections since last clinic visit No   Unplanned office visit, urgent care, ED, or hospital admission in the last 4 weeks  Yes  [Pt was in the ER due to severe migraine-dr prescribed Metoclopramide for a couple days.]   How does patient/caregiver feel medication is working? Very good   Financial problems or insurance changes  No   Since the previous refill, were any specialty medication doses or scheduled injections missed or delayed?  No   Does this patient require a clinical escalation to a pharmacist? No            Delivery Questions      Flowsheet Row Most Recent Value   Delivery method Beeline   Delivery address verified with patient/caregiver? Yes   Delivery address Home   Number of medications in delivery 1   Medication(s) being filled and delivered Erenumab-Aooe   Copay verified? Yes   Copay amount $0   Copay form of payment No copayment ($0)                   Follow-up: 21 day(s)     Bel Wasserman, Pharmacy Technician  Specialty Pharmacy Technician

## 2024-03-04 ENCOUNTER — HOSPITAL ENCOUNTER (EMERGENCY)
Facility: HOSPITAL | Age: 39
Discharge: HOME | End: 2024-03-04
Attending: STUDENT IN AN ORGANIZED HEALTH CARE EDUCATION/TRAINING PROGRAM | Admitting: STUDENT IN AN ORGANIZED HEALTH CARE EDUCATION/TRAINING PROGRAM
Payer: COMMERCIAL

## 2024-03-04 ENCOUNTER — APPOINTMENT (OUTPATIENT)
Dept: GENERAL RADIOLOGY | Facility: HOSPITAL | Age: 39
End: 2024-03-04
Payer: COMMERCIAL

## 2024-03-04 VITALS
SYSTOLIC BLOOD PRESSURE: 134 MMHG | HEIGHT: 69 IN | TEMPERATURE: 98.3 F | HEART RATE: 75 BPM | WEIGHT: 215 LBS | BODY MASS INDEX: 31.84 KG/M2 | RESPIRATION RATE: 17 BRPM | OXYGEN SATURATION: 100 % | DIASTOLIC BLOOD PRESSURE: 85 MMHG

## 2024-03-04 DIAGNOSIS — S69.91XA HAND INJURY, RIGHT, INITIAL ENCOUNTER: Primary | ICD-10-CM

## 2024-03-04 DIAGNOSIS — S60.221A CONTUSION OF RIGHT HAND, INITIAL ENCOUNTER: ICD-10-CM

## 2024-03-04 PROCEDURE — 99283 EMERGENCY DEPT VISIT LOW MDM: CPT

## 2024-03-04 PROCEDURE — 99283 EMERGENCY DEPT VISIT LOW MDM: CPT | Performed by: STUDENT IN AN ORGANIZED HEALTH CARE EDUCATION/TRAINING PROGRAM

## 2024-03-04 PROCEDURE — 73130 X-RAY EXAM OF HAND: CPT

## 2024-03-04 RX ORDER — ACETAMINOPHEN 500 MG
1000 TABLET ORAL ONCE
Status: COMPLETED | OUTPATIENT
Start: 2024-03-04 | End: 2024-03-04

## 2024-03-04 RX ADMIN — ACETAMINOPHEN 1000 MG: 500 TABLET ORAL at 22:51

## 2024-03-04 NOTE — Clinical Note
[facility] FSED [facility]  [address]  [place] [address]    Corby Huber was seen and treated in our emergency department on 3/4/2024.  She may return to work on 03/06/2024.         Thank you for choosing [facility].    Nicolas Perkins MD

## 2024-03-04 NOTE — Clinical Note
[facility]  [address]  [place] [address]    Corby Huber was seen and treated in our emergency department on 3/4/2024.  She may return to work on 03/06/2024.         Thank you for choosing [facility].    Nicolas Perkins MD

## 2024-03-05 ENCOUNTER — SPECIALTY PHARMACY (OUTPATIENT)
Dept: NEUROLOGY | Facility: CLINIC | Age: 39
End: 2024-03-05
Payer: COMMERCIAL

## 2024-03-05 NOTE — FSED PROVIDER NOTE
Subjective   History of Present Illness  Pt is a 38 y.o. female with PMH as listed who presents for   Chief Complaint   Patient presents with    Hand Injury     Hit R hand microwave pain and swelling on first two fingers        Patient is a 38-year-old female presents for right hand injury.  States that she struck her hand as she was trying around and hit a piece of wood next or microwave with some pain in the second and third metacarpals of her right hand.  Noticed some swelling and bruising and so came to the ED for further evaluation and management.  Did not take any medications prior to arrival.  No other new complaints, no other injuries.    Review of Systems    Past Medical History:   Diagnosis Date    Environmental allergies     Fissure, anal     Hypothyroidism     Migraines        No Known Allergies    Past Surgical History:   Procedure Laterality Date    APPENDECTOMY N/A 11/23/2022    Procedure: APPENDECTOMY LAPAROSCOPIC;  Surgeon: Mildred Munroe MD;  Location: [facility] OR;  Service: General;  Laterality: N/A;    FOOT SURGERY N/A 01/2022    HEMORRHOIDECTOMY  May 2010       Family History   Problem Relation Age of Onset    Liver disease Father     Diabetes Maternal Uncle     Heart disease Maternal Uncle     Hypertension Maternal Uncle     Arthritis Maternal Grandmother     Heart disease Maternal Grandmother     Hypertension Maternal Grandmother     Seizures Maternal Grandfather        Social History     Socioeconomic History    Marital status: Single   Tobacco Use    Smoking status: Never    Smokeless tobacco: Never   Vaping Use    Vaping status: Never Used   Substance and Sexual Activity    Alcohol use: Never    Drug use: Never    Sexual activity: Defer           Objective   Physical Exam  Constitutional:       Appearance: Normal appearance.   HENT:      Head: Normocephalic and atraumatic.      Mouth/Throat:      Mouth: Mucous membranes are moist.      Pharynx: Oropharynx is clear.   Eyes:       Conjunctiva/sclera: Conjunctivae normal.   Cardiovascular:      Rate and Rhythm: Normal rate.   Pulmonary:      Effort: Pulmonary effort is normal.   Abdominal:      General: Abdomen is flat.   Musculoskeletal:      Cervical back: Neck supple.      Comments: Ecchymoses and contusion to first metacarpal of second and third digit of right hand.  InView intact distally, cap refill less than 2 seconds.   Skin:     General: Skin is warm and dry.   Neurological:      Mental Status: She is alert.   Psychiatric:         Mood and Affect: Mood normal.         Procedures           ED Course  ED Course as of 03/04/24 2320   Mon Mar 04, 2024   2239 Patient is a 38-year-old female presents for right hand injury.  Exam is significant for ecchymoses and contusion to first metacarpal of second and third digit of right hand with tenderness to palpation.  Will obtain plain films and treat with Tylenol initially. [JF]   2319 X-ray shows no acute fracture.  Discussed with patient plan for discharge with Tylenol Motrin alternating and ice compresses.  She states understanding and agrees with plan of care.  Will follow-up with her PCP. [JF]      ED Course User Index  [JF] Nicolas Perkins MD                                           Medical Decision Making  My differential diagnosis of the upper extremity pain or injury includes but is not limited to contusions of the shoulder, forearm, arm, wrist, elbow or hand, dislocations of shoulder, elbow, wrist, digits, nursemaid's elbow (age-appropriate), shoulder sprain, elbow sprain, wrist sprain, digit sprain, shoulder strain, arm strain, forearm strain, elbow strain, wrist strain, hand sprain, digit strain, lacerations of the upper extremity, fractures both closed and open of clavicle, scapula, humerus, radius, ulna, bones of the wrist, hands and digits, cellulitis or abscess, cervical radiculopathy, radial nerve palsy, neurogenic upper extremity pain, angina equivalent, SVT, DVT, arterial  occlusion, compartment syndrome.      Problems Addressed:  Contusion of right hand, initial encounter: complicated acute illness or injury  Hand injury, right, initial encounter: complicated acute illness or injury    Amount and/or Complexity of Data Reviewed  Radiology: ordered.     Details: X-ray right hand shows no acute fracture or dislocation based on my interpretation.    Risk  OTC drugs.        Final diagnoses:   Hand injury, right, initial encounter   Contusion of right hand, initial encounter       ED Disposition  ED Disposition       ED Disposition   Discharge    Condition   Stable    Comment   --               Sandra Prasad MD  [address] [facility] [address] [phone]    Schedule an appointment as soon as possible for a visit in 3 days  For re-evaluation         Medication List        Changed      * levothyroxine 150 MCG tablet  Commonly known as: Synthroid  Take 1 tablet by mouth Daily.  What changed: how much to take     * Synthroid 137 MCG tablet  Generic drug: levothyroxine  What changed: Another medication with the same name was changed. Make sure you understand how and when to take each.           * This list has 2 medication(s) that are the same as other medications prescribed for you. Read the directions carefully, and ask your doctor or other care provider to review them with you.

## 2024-03-05 NOTE — PROGRESS NOTES
Specialty Pharmacy Refill Coordination Note     Corby is a 38 y.o. female contacted today regarding refills of  Aimovig specialty medication(s).    Reviewed and verified with patient:       Specialty medication(s) and dose(s) confirmed: yes    Refill Questions      Flowsheet Row Most Recent Value   Changes to allergies? No   Changes to medications? No   New conditions or infections since last clinic visit No   Unplanned office visit, urgent care, ED, or hospital admission in the last 4 weeks  No   How does patient/caregiver feel medication is working? Very good   Financial problems or insurance changes  No   Since the previous refill, were any specialty medication doses or scheduled injections missed or delayed?  No   Does this patient require a clinical escalation to a pharmacist? No            Delivery Questions      Flowsheet Row Most Recent Value   Delivery method Beeline   Delivery address verified with patient/caregiver? Yes   Delivery address Home   Number of medications in delivery 1   Medication(s) being filled and delivered Erenumab-Aooe   Doses left of specialty medications 0   Copay verified? Yes  [SPRX-ERX DOWN: $5 CH COPAY CARD DOWN last fill and consent obtained for potential copay differences.]   Copay amount SPRX-ERX DOWN: $5 CH COPAY CARD DOWN last fill and consent obtained for potential copay differences.   Copay form of payment Credit/debit on file                   Follow-up: 21 day(s)     Bel Wasserman, Pharmacy Technician  Specialty Pharmacy Technician

## 2024-04-01 ENCOUNTER — OFFICE VISIT (OUTPATIENT)
Dept: FAMILY MEDICINE CLINIC | Facility: CLINIC | Age: 39
End: 2024-04-01
Payer: COMMERCIAL

## 2024-04-01 VITALS
TEMPERATURE: 98.5 F | DIASTOLIC BLOOD PRESSURE: 74 MMHG | WEIGHT: 222 LBS | HEIGHT: 69 IN | RESPIRATION RATE: 16 BRPM | SYSTOLIC BLOOD PRESSURE: 122 MMHG | HEART RATE: 83 BPM | BODY MASS INDEX: 32.88 KG/M2 | OXYGEN SATURATION: 98 %

## 2024-04-01 DIAGNOSIS — E06.3 HASHIMOTO'S DISEASE: ICD-10-CM

## 2024-04-01 DIAGNOSIS — J02.9 SORE THROAT: ICD-10-CM

## 2024-04-01 DIAGNOSIS — J01.10 ACUTE NON-RECURRENT FRONTAL SINUSITIS: ICD-10-CM

## 2024-04-01 DIAGNOSIS — R05.9 COUGH, UNSPECIFIED TYPE: Primary | ICD-10-CM

## 2024-04-01 LAB
EXPIRATION DATE: NORMAL
FLUAV AG NPH QL: NEGATIVE
FLUBV AG NPH QL: NEGATIVE
INTERNAL CONTROL: NORMAL
Lab: NORMAL
S PYO AG THROAT QL: NEGATIVE
SARS-COV-2 AG UPPER RESP QL IA.RAPID: NOT DETECTED

## 2024-04-01 RX ORDER — AZITHROMYCIN 250 MG/1
TABLET, FILM COATED ORAL
Qty: 6 TABLET | Refills: 0 | Status: SHIPPED | OUTPATIENT
Start: 2024-04-01

## 2024-04-01 NOTE — PROGRESS NOTES
Subjective   Corby Huber is a 38 y.o. female.     Chief Complaint   Patient presents with    Sinusitis    Cough    Sore Throat       Sinusitis  Associated symptoms include congestion, coughing, sinus pressure and a sore throat. Pertinent negatives include no shortness of breath.   Cough  Associated symptoms include a sore throat. Pertinent negatives include no shortness of breath or wheezing.   Sore Throat   Associated symptoms include congestion and coughing. Pertinent negatives include no shortness of breath or stridor.      Patient here for complaining of sinus congestion, yellow-green drainage, cough, sore throat.  Sick for 2 weeks.  Taking Mucinex and Robitussin.  Not getting better.  No shortness of breath, chest pain fever or chills.  Father and brother also has similar symptoms.  The following portions of the patient's history were reviewed and updated as appropriate: allergies, current medications, past family history, past medical history, past social history, past surgical history and problem list.    Review of Systems   HENT:  Positive for congestion, sinus pressure and sore throat.    Respiratory:  Positive for cough. Negative for choking, chest tightness, shortness of breath, wheezing and stridor.    Cardiovascular: Negative.    Gastrointestinal: Negative.  Negative for abdominal distention.       No Known Allergies    Current Outpatient Medications on File Prior to Visit   Medication Sig Dispense Refill    Butalbital-APAP-Caff-Cod -56-30 MG capsule Take 1 tablet by mouth 3 (Three) Times a Day As Needed (headache). 30 capsule 0    Dihydroergotamine Mesylate HFA (Trudhesa) 0.725 MG/ACT aerosol solution 2 sprays into the nostril(s) as directed by provider Daily As Needed (migraine). 4 mL 5    diphenhydrAMINE (BENADRYL) 25 MG tablet Take 1 tablet by mouth Every 6 (Six) Hours As Needed (headache). 10 tablet 0    Erenumab-aooe (AIMOVIG) 140 MG/ML auto-injector Inject 1 mL under the skin into the  appropriate area as directed Every 30 (Thirty) Days. 1 mL 11    Ibuprofen (ADVIL MIGRAINE PO) Take 1 tablet by mouth Daily As Needed.      metoclopramide (REGLAN) 10 MG tablet Take 1 tablet by mouth 3 (Three) Times a Day As Needed (Headache). 12 tablet 0    multivitamin (THERAGRAN) tablet tablet Take  by mouth Daily.      promethazine (PHENERGAN) 50 MG tablet Take 0.5 tablets by mouth Every 6 (Six) Hours As Needed for Nausea or Vomiting. 12 tablet 1    rizatriptan (Maxalt) 10 MG tablet Take 1 tablet by mouth 1 (One) Time As Needed for Migraine. May repeat in 2 hours if needed. Max of 2 tablets in 24 hours. 12 tablet 2    Synthroid 137 MCG tablet Take 1 tablet by mouth Daily.      topiramate (Topamax) 25 MG tablet Take 2 tablets by mouth Every Night. 60 tablet 5    diphenhydrAMINE (BENADRYL) 25 mg capsule Take 1 capsule by mouth As Needed.      white petrolatum-dilTIAZem Apply to area at least 3 times per day (Patient not taking: Reported on 11/9/2023) 30 g 3    [DISCONTINUED] levothyroxine (Synthroid) 150 MCG tablet Take 1 tablet by mouth Daily. (Patient taking differently: Take 137 mcg by mouth Daily.) 90 tablet 3     No current facility-administered medications on file prior to visit.       Family History   Problem Relation Age of Onset    Liver disease Father     Diabetes Maternal Uncle     Heart disease Maternal Uncle     Hypertension Maternal Uncle     Arthritis Maternal Grandmother     Heart disease Maternal Grandmother     Hypertension Maternal Grandmother     Seizures Maternal Grandfather        Past Medical History:   Diagnosis Date    Environmental allergies     Fissure, anal     Hypothyroidism     Migraines        Past Surgical History:   Procedure Laterality Date    APPENDECTOMY N/A 11/23/2022    Procedure: APPENDECTOMY LAPAROSCOPIC;  Surgeon: Mildred Munroe MD;  Location: [facility] MAIN OR;  Service: General;  Laterality: N/A;    FOOT SURGERY N/A 01/2022    HEMORRHOIDECTOMY  May 2010       Social  "History     Socioeconomic History    Marital status: Single   Tobacco Use    Smoking status: Never     Passive exposure: Never    Smokeless tobacco: Never   Vaping Use    Vaping status: Never Used   Substance and Sexual Activity    Alcohol use: Never    Drug use: Never    Sexual activity: Defer       Patient Active Problem List   Diagnosis    Ankle injury, left, initial encounter    Benign neoplasm of cerebral meninges    Lumbar disc herniation with radiculopathy    Lumbar spondylosis with myelopathy    Hashimoto's disease    Hypothyroidism    Intractable migraine without aura and without status migrainosus    Vitamin D deficiency    Periodic headache syndrome, not intractable    Acute appendicitis, unspecified acute appendicitis type       /74   Pulse 83   Temp 98.5 °F (36.9 °C)   Resp 16   Ht 175.3 cm (69.02\")   Wt 101 kg (222 lb)   LMP 02/27/2024 (Approximate)   SpO2 98%   BMI 32.77 kg/m²   Body mass index is 32.77 kg/m².    Objective   Physical Exam  Vitals and nursing note reviewed.   Constitutional:       Appearance: She is well-developed.   HENT:      Nose: No congestion or rhinorrhea.      Mouth/Throat:      Mouth: Mucous membranes are moist.      Pharynx: No oropharyngeal exudate or posterior oropharyngeal erythema.   Eyes:      Pupils: Pupils are equal, round, and reactive to light.   Neck:      Thyroid: No thyromegaly.      Vascular: No JVD.      Trachea: No tracheal deviation.   Cardiovascular:      Rate and Rhythm: Normal rate and regular rhythm.      Heart sounds: No murmur heard.  Pulmonary:      Effort: Pulmonary effort is normal. No respiratory distress.      Breath sounds: Normal breath sounds. No wheezing.   Musculoskeletal:      Cervical back: Normal range of motion and neck supple.   Lymphadenopathy:      Cervical: No cervical adenopathy.   Neurological:      Mental Status: She is alert.           Assessment & Plan   Diagnoses and all orders for this visit:    1. Cough, unspecified " type (Primary)  -     POCT SARS-CoV-2 Antigen LIDIA  -     POCT Influenza A/B    2. Sore throat  -     POCT rapid strep A    3. Hashimoto's disease    4. Acute non-recurrent frontal sinusitis    Other orders  -     azithromycin (ZITHROMAX) 250 MG tablet; Take 2 tablets the first day, then 1 tablet daily for 4 days.  Dispense: 6 tablet; Refill: 0    Continue Synthroid.  Keep follow-up with endocrinology.  Continue other medication.  Z-Tommy.  Add Allegra.  Return if no better.  EHR dragon/transcription disclaimer:  Part of this note are created by electronic transcription/translation of spoken language to printed text and thus may lead to erroneous, or at times, nonsensical words or phrases inadvertently transcribed.  Although I have reviewed for such errors, some may still exist.

## 2024-04-03 ENCOUNTER — SPECIALTY PHARMACY (OUTPATIENT)
Dept: NEUROLOGY | Facility: CLINIC | Age: 39
End: 2024-04-03
Payer: COMMERCIAL

## 2024-04-03 NOTE — PROGRESS NOTES
Specialty Pharmacy Refill Coordination Note     Corby is a 38 y.o. female contacted today regarding refills of  Aimovig specialty medication(s).    Reviewed and verified with patient:       Specialty medication(s) and dose(s) confirmed: yes    Refill Questions      Flowsheet Row Most Recent Value   Changes to allergies? No   Changes to medications? No   New conditions or infections since last clinic visit No   Unplanned office visit, urgent care, ED, or hospital admission in the last 4 weeks  No   How does patient/caregiver feel medication is working? Very good   Financial problems or insurance changes  No   Since the previous refill, were any specialty medication doses or scheduled injections missed or delayed?  No   Does this patient require a clinical escalation to a pharmacist? No            Delivery Questions      Flowsheet Row Most Recent Value   Delivery method Beeline   Delivery address verified with patient/caregiver? Yes   Delivery address Home   Number of medications in delivery 1   Medication(s) being filled and delivered Erenumab-Aooe   Doses left of specialty medications 0   Copay verified? Yes   Copay amount $5   Copay form of payment Credit/debit on file                   Follow-up: 21 day(s)     Bel Wasserman, Pharmacy Technician  Specialty Pharmacy Technician

## 2024-04-29 ENCOUNTER — SPECIALTY PHARMACY (OUTPATIENT)
Dept: NEUROLOGY | Facility: CLINIC | Age: 39
End: 2024-04-29
Payer: COMMERCIAL

## 2024-05-23 ENCOUNTER — SPECIALTY PHARMACY (OUTPATIENT)
Dept: NEUROLOGY | Facility: CLINIC | Age: 39
End: 2024-05-23
Payer: COMMERCIAL

## 2024-06-21 ENCOUNTER — SPECIALTY PHARMACY (OUTPATIENT)
Dept: NEUROLOGY | Facility: CLINIC | Age: 39
End: 2024-06-21
Payer: COMMERCIAL

## 2024-07-17 ENCOUNTER — SPECIALTY PHARMACY (OUTPATIENT)
Dept: NEUROLOGY | Facility: CLINIC | Age: 39
End: 2024-07-17
Payer: COMMERCIAL

## 2024-07-17 NOTE — PROGRESS NOTES
Specialty Pharmacy Patient Management Program  Neurology Reassessment     Corby Huber is a 38 y.o. female with chronic migraine seen by a Neurology provider and enrolled in the Neurology Patient Management program offered by [facility].  A follow-up outreach was conducted, including assessment of continued therapy appropriateness, medication adherence, and side effect incidence and management for erenumab (Aimovig).     Changes to Insurance Coverage or Financial Support  No changes.       Relevant Past Medical History and Comorbidities  Relevant medical history and concomitant health conditions were discussed with the patient. The patient's chart has been reviewed for relevant past medical history and comorbid health conditions and updated as necessary.   Past Medical History:   Diagnosis Date    Environmental allergies     Fissure, anal     Hypothyroidism     Migraines      Social History     Socioeconomic History    Marital status: Single   Tobacco Use    Smoking status: Never     Passive exposure: Never    Smokeless tobacco: Never   Vaping Use    Vaping status: Never Used   Substance and Sexual Activity    Alcohol use: Never    Drug use: Never    Sexual activity: Defer     Problem list reviewed by Timo Puentes RPH on 7/17/2024 at  9:42 AM      Hospitalizations and Urgent Care Since Last Assessment  ED Visits, Admissions, or Hospitalizations: none.   Urgent Office Visits: none.       Allergies  Known allergies and reactions were discussed with the patient. The patient's chart has been reviewed for allergy information and updated as necessary.   No Known Allergies  Allergies reviewed by Timo Puentes RPH on 7/17/2024 at  9:42 AM      Relevant Laboratory Values  Common labs          11/14/2023    09:20 1/28/2024    12:37   Common Labs   Glucose  102    BUN  12    Creatinine  0.74    Sodium  139    Potassium  4.1    Chloride  103    Calcium  9.4    Albumin  4.7    Total Bilirubin   0.3    Alkaline Phosphatase  80    AST (SGOT)  19    ALT (SGPT)  20    WBC 6.15     4.09    Hemoglobin 13.0     14.6    Hematocrit 40.9     44.7    Platelets 223     197       Details          This result is from an external source.               Lab Assessment  The above labs have been reviewed. No dose adjustments are needed for the specialty medication(s) based on the labs.       Current Medication List  This medication list has been reviewed with the patient and evaluated for any interactions or necessary modifications/recommendations, and updated to include all prescription medications, OTC medications, and supplements the patient is currently taking.  This list reflects what is contained in the patient's profile, which has also been marked as reviewed to communicate to other providers it is the most up to date version of the patient's current medication therapy.     Current Outpatient Medications:     azithromycin (ZITHROMAX) 250 MG tablet, Take 2 tablets the first day, then 1 tablet daily for 4 days., Disp: 6 tablet, Rfl: 0    Butalbital-APAP-Caff-Cod -59-30 MG capsule, Take 1 tablet by mouth 3 (Three) Times a Day As Needed (headache)., Disp: 30 capsule, Rfl: 0    Dihydroergotamine Mesylate HFA (Trudhesa) 0.725 MG/ACT aerosol solution, 2 sprays into the nostril(s) as directed by provider Daily As Needed (migraine)., Disp: 4 mL, Rfl: 5    diphenhydrAMINE (BENADRYL) 25 mg capsule, Take 1 capsule by mouth As Needed., Disp: , Rfl:     diphenhydrAMINE (BENADRYL) 25 MG tablet, Take 1 tablet by mouth Every 6 (Six) Hours As Needed (headache)., Disp: 10 tablet, Rfl: 0    Erenumab-aooe (AIMOVIG) 140 MG/ML auto-injector, Inject 1 mL under the skin into the appropriate area as directed Every 30 (Thirty) Days., Disp: 1 mL, Rfl: 11    Ibuprofen (ADVIL MIGRAINE PO), Take 1 tablet by mouth Daily As Needed., Disp: , Rfl:     metoclopramide (REGLAN) 10 MG tablet, Take 1 tablet by mouth 3 (Three) Times a Day As Needed  (Headache)., Disp: 12 tablet, Rfl: 0    multivitamin (THERAGRAN) tablet tablet, Take  by mouth Daily., Disp: , Rfl:     promethazine (PHENERGAN) 50 MG tablet, Take 0.5 tablets by mouth Every 6 (Six) Hours As Needed for Nausea or Vomiting., Disp: 12 tablet, Rfl: 1    rizatriptan (Maxalt) 10 MG tablet, Take 1 tablet by mouth 1 (One) Time As Needed for Migraine. May repeat in 2 hours if needed. Max of 2 tablets in 24 hours., Disp: 12 tablet, Rfl: 2    Synthroid 137 MCG tablet, Take 1 tablet by mouth Daily., Disp: , Rfl:     topiramate (Topamax) 25 MG tablet, Take 2 tablets by mouth Every Night., Disp: 60 tablet, Rfl: 5    white petrolatum-dilTIAZem, Apply to area at least 3 times per day (Patient not taking: Reported on 11/9/2023), Disp: 30 g, Rfl: 3    Medicines reviewed by Timo Puentes [facility] on 7/17/2024 at  9:42 AM    Drug Interactions  None identified.       Adverse Drug Reactions  Medication tolerability: Tolerating with no to minimal ADRs  Medication plan: Continue therapy with normal follow-up  Plan for ADR Management: N/A, no ADR's      Adherence, Self-Administration, and Current Therapy Problems  Adherence related patient's specialty therapy was discussed with the patient. The Adherence segment of this outreach has been reviewed and updated.   Adherence Questions  Linked Medication(s) Assessed: Erenumab-Aooe  On average, how many doses/injections does the patient miss per month?: 0  What are the identified reasons for non-adherence or missed doses? : no problems identified  What is the estimated medication adherence level?: %  Based on the patient/caregiver response and refill history, does this patient require an MTP to track adherence improvements?: no    Additional Barriers to Patient Self-Administration: No barriers.  Methods for Supporting Patient Self-Administration: No further support needed at this time.    Recently Close Medication Therapy Problems  No medication therapy recommendations to  display  Open Medication Therapy Problems  No medication therapy recommendations to display     Goals of Therapy  Goals related to the patient's specialty therapy was discussed with the patient. The Patient Goals segment of this outreach has been reviewed and updated.    Goals Addressed Today        Specialty Pharmacy General Goal      Reduce/maintain reduced frequency and severity of migraines. Currently with ~2-3 headache days per month that are mild in nature.     7/17/24 clinical reassessment: Patient reports the same continued benefits in headache frequency of 2-3/month. No side-effects or concerns at this time.     1/8/24 clinical reassessment: Patient reports the same 2-3 headache days per month on average. She did have a slight increase in headache days during December 2023 which she attributes to weather and the stress of the holidays. She anticipates that this will return to her normal over the next month but knows to call if it doesn't or if frequency or severity worsen over time.                Quality of Life Assessment   Quality of Life related to the patient's enrollment in the patient management program and services provided was discussed with the patient. The QOL segment of this outreach has been reviewed and updated.   Quality of Life Improvement Scale: 8-Moderately better      Reassessment Plan & Follow-Up  Medication Therapy Changes: No changes, continuing erenumab for migraine prevention per patient's neurology provider.  Related Plans, Therapy Recommendations, or Issues to Be Addressed: Nothing further to be addressed at this time.   Pharmacist to perform regular reassessments no more than (6) months from the previous assessment.  Care Coordinator to set up future refill outreaches, coordinate prescription delivery, and escalate clinical questions to pharmacist.     Attestation  Therapeutic appropriateness: Appropriate  I attest the patient was actively involved in and has agreed to the above  plan of care. If the prescribed therapy is at any point deemed not appropriate based on the current or future assessments, a consultation will be initiated with the patient's specialty care provider to determine the best course of action. The revised plan of therapy will be documented along with any additional patient education provided. Discussed aforementioned material with patient by phone.    Timo Puentes, PharmD, [facility] Specialty Pharmacist, Neurology  7/17/2024  09:44 EDT

## 2024-07-26 ENCOUNTER — SPECIALTY PHARMACY (OUTPATIENT)
Dept: NEUROLOGY | Facility: CLINIC | Age: 39
End: 2024-07-26
Payer: COMMERCIAL

## 2024-07-26 NOTE — PROGRESS NOTES
Specialty Pharmacy Refill Coordination Note     Corby is a 38 y.o. female contacted today regarding refills of  Aimovig specialty medication(s).    Reviewed and verified with patient:       Specialty medication(s) and dose(s) confirmed: yes    Refill Questions      Flowsheet Row Most Recent Value   Changes to allergies? No   Changes to medications? No   New conditions or infections since last clinic visit No   Unplanned office visit, urgent care, ED, or hospital admission in the last 4 weeks  No   How does patient/caregiver feel medication is working? Very good   Financial problems or insurance changes  No   Since the previous refill, were any specialty medication doses or scheduled injections missed or delayed?  No   Does this patient require a clinical escalation to a pharmacist? No            Delivery Questions      Flowsheet Row Most Recent Value   Delivery method [employer]eline   Delivery address verified with patient/caregiver? Yes   Delivery address Home   Number of medications in delivery 1   Medication(s) being filled and delivered Erenumab-Aooe   Doses left of specialty medications 0   Copay verified? Yes   Copay amount $5   Copay form of payment Credit/debit on file   Ship Date 7/29   Delivery Date 7/30   Signature Required No                   Follow-up: 21 day(s)     Bel Wasserman, Pharmacy Technician  Specialty Pharmacy Technician

## 2024-08-26 ENCOUNTER — SPECIALTY PHARMACY (OUTPATIENT)
Dept: NEUROLOGY | Facility: CLINIC | Age: 39
End: 2024-08-26
Payer: COMMERCIAL

## 2024-08-26 NOTE — PROGRESS NOTES
Specialty Pharmacy Refill Coordination Note     Corby is a 38 y.o. female contacted today regarding refills of Aimovig specialty medication(s).    Reviewed and verified with patient:       Specialty medication(s) and dose(s) confirmed: yes    Refill Questions      Flowsheet Row Most Recent Value   Changes to allergies? No   Changes to medications? No   New conditions or infections since last clinic visit No   Unplanned office visit, urgent care, ED, or hospital admission in the last 4 weeks  No   How does patient/caregiver feel medication is working? Very good   Financial problems or insurance changes  No   Since the previous refill, were any specialty medication doses or scheduled injections missed or delayed?  No   Does this patient require a clinical escalation to a pharmacist? No            Delivery Questions      Flowsheet Row Most Recent Value   Delivery method UPS   Delivery address verified with patient/caregiver? Yes   Delivery address Home   Number of medications in delivery 1   Medication(s) being filled and delivered Erenumab-Aooe   Doses left of specialty medications 0   Copay verified? Yes   Copay amount $5   Copay form of payment Credit/debit on file   Ship Date 8/27   Delivery Date 8/28   Signature Required No                   Follow-up: 21 day(s)     Bel Wasserman, Pharmacy Technician  Specialty Pharmacy Technician

## 2024-09-23 ENCOUNTER — SPECIALTY PHARMACY (OUTPATIENT)
Dept: NEUROLOGY | Facility: CLINIC | Age: 39
End: 2024-09-23
Payer: COMMERCIAL

## 2024-10-22 ENCOUNTER — SPECIALTY PHARMACY (OUTPATIENT)
Dept: NEUROLOGY | Facility: CLINIC | Age: 39
End: 2024-10-22
Payer: COMMERCIAL

## 2024-10-22 NOTE — PROGRESS NOTES
Specialty Pharmacy Refill Coordination Note     Corby is a 38 y.o. female contacted today regarding refills of Aimovig specialty medication(s).    Reviewed and verified with patient:       Specialty medication(s) and dose(s) confirmed: yes    Refill Questions      Flowsheet Row Most Recent Value   Changes to allergies? No   Changes to medications? No   New conditions or infections since last clinic visit No   Unplanned office visit, urgent care, ED, or hospital admission in the last 4 weeks  No   How does patient/caregiver feel medication is working? Very good   Financial problems or insurance changes  No   Since the previous refill, were any specialty medication doses or scheduled injections missed or delayed?  No   Does this patient require a clinical escalation to a pharmacist? No            Delivery Questions      Flowsheet Row Most Recent Value   Delivery method UPS   Delivery address verified with patient/caregiver? Yes   Delivery address Home   Number of medications in delivery 1   Medication(s) being filled and delivered Erenumab-aooe (AIMOVIG)   Doses left of specialty medications 0   Copay verified? Yes   Copay amount $5   Copay form of payment Credit/debit on file   Ship Date 10/22   Delivery Date 10/23   Signature Required No                   Follow-up: 21 day(s)     Bel Wasserman, Pharmacy Technician  Specialty Pharmacy Technician

## 2024-11-14 ENCOUNTER — OFFICE VISIT (OUTPATIENT)
Dept: NEUROLOGY | Facility: CLINIC | Age: 39
End: 2024-11-14
Payer: COMMERCIAL

## 2024-11-14 VITALS
BODY MASS INDEX: 32.58 KG/M2 | SYSTOLIC BLOOD PRESSURE: 98 MMHG | RESPIRATION RATE: 20 BRPM | WEIGHT: 220 LBS | HEART RATE: 68 BPM | DIASTOLIC BLOOD PRESSURE: 64 MMHG | OXYGEN SATURATION: 98 % | HEIGHT: 69 IN

## 2024-11-14 DIAGNOSIS — G43.019 INTRACTABLE MIGRAINE WITHOUT AURA AND WITHOUT STATUS MIGRAINOSUS: Primary | ICD-10-CM

## 2024-11-14 PROCEDURE — 99213 OFFICE O/P EST LOW 20 MIN: CPT | Performed by: NURSE PRACTITIONER

## 2024-11-14 RX ORDER — ERGOCALCIFEROL 1.25 MG/1
50000 CAPSULE, LIQUID FILLED ORAL
COMMUNITY
Start: 2024-05-22

## 2024-11-14 NOTE — PROGRESS NOTES
Jade Huber is a 38 y.o. female presenting for follow-up.  She has a history of migraine headaches.  She is taking Aimovig 140 mg every 30 days.  This works well for her.  She uses rizatriptan as needed.    History of Present Illness     Review of Systems    I have reviewed and confirmed the accuracy of the patient's history: Chief complaint, HPI, ROS, Subjective, and Past Family Social History as entered by the MA/LPN/RN. Magui Mercado MA 11/14/24      Objective     Physical Examination:  General Appearance:  Well developed, well nourished, well groomed, alert, and cooperative.  HEENT: Normocephalic.    Neck and Spine: Normal range of motion.  Normal alignment. No mass or tenderness. No bruits.  Cardiac: Regular rate and rhythm. No murmurs.  Peripheral Vasculature: Radial and pedal pulses are equal and symmetric.   Extremities: No edema or deformities. Normal joint ROM.  Skin: No rashes or birth marks.      Neurological examination:   Higher Integrative Function: Oriented to time, place, and person. Normal registration, recall attention span and concentration. Normal language including comprehension, spontaneous speech, repetition, reading, writing, naming and vocabulary. No neglect with normal visual-spatial function and construction. Normal fund of knowledge and higher integrative function.   CN II: Pupils are equal, round and reactive to light. Normal visual acuity and visual fields.   CN III IV VI: Extraocular movements are full without nystagmus.   CN V: Normal facial sensation and strength of muscles of mastication.   CN VII: Facial movements are symmetric. No weakness.   CN VIII: Auditory acuity is normal.  CN IX & X: Symmetric palatal movement.   CN XI: Sternocleidomastoid and trapezius are normal. No weakness.   CN XII: The tongue is midline. No atrophy or fasciculations.  Motor: Normal muscle strength, bulk and tone in upper and lower extremities. No fasciculations, rigidity, spasticity, or  abnormal movements.   Reflexes: 2+ in the upper and lower extremities. Plantar responses are flexor.   Sensation: Normal light touch, pinprick, vibration, temperature, and proprioception in the arms and legs.   Station and Gait: Normal gait and station.   Coordination: Finger to nose test shows no dysmetria. Rapid alternating movements are normal. Heel to shin is normal.           Assessment & Plan   Diagnoses and all orders for this visit:    1. Intractable migraine without aura and without status migrainosus (Primary)    She is doing well.  She will continue Aimovig 140 mg every 30 days along with rizatriptan as needed.  Follow-up annually, sooner if needed.

## 2024-11-19 ENCOUNTER — SPECIALTY PHARMACY (OUTPATIENT)
Dept: NEUROLOGY | Facility: CLINIC | Age: 39
End: 2024-11-19
Payer: COMMERCIAL

## 2024-11-19 NOTE — PROGRESS NOTES
Specialty Pharmacy Refill Coordination Note     Corby is a 38 y.o. female contacted today regarding refills of Aimovig specialty medication(s).    Reviewed and verified with patient:       Specialty medication(s) and dose(s) confirmed: yes    Refill Questions      Flowsheet Row Most Recent Value   Changes to allergies? No   Changes to medications? No   New conditions or infections since last clinic visit No   Unplanned office visit, urgent care, ED, or hospital admission in the last 4 weeks  No   How does patient/caregiver feel medication is working? Very good   Financial problems or insurance changes  No   Since the previous refill, were any specialty medication doses or scheduled injections missed or delayed?  No   Does this patient require a clinical escalation to a pharmacist? No            Delivery Questions      Flowsheet Row Most Recent Value   Delivery method UPS   Delivery address verified with patient/caregiver? Yes   Delivery address Home   Number of medications in delivery 1   Medication(s) being filled and delivered Erenumab-aooe (AIMOVIG)   Doses left of specialty medications 0   Copay verified? Yes   Copay amount $5   Copay form of payment Credit/debit on file   Ship Date 11/19   Delivery Date 11/20   Signature Required No                   Follow-up: 21 day(s)     Bel Wasserman, Pharmacy Technician  Specialty Pharmacy Technician

## 2024-12-17 ENCOUNTER — SPECIALTY PHARMACY (OUTPATIENT)
Dept: NEUROLOGY | Facility: CLINIC | Age: 39
End: 2024-12-17
Payer: COMMERCIAL

## 2024-12-17 RX ORDER — ALBUTEROL SULFATE 90 UG/1
2 INHALANT RESPIRATORY (INHALATION) EVERY 4 HOURS PRN
Qty: 90 G | Refills: 0 | Status: SHIPPED | OUTPATIENT
Start: 2024-12-17

## 2024-12-17 RX ORDER — METHYLPREDNISOLONE 4 MG/1
TABLET ORAL
Qty: 1 EACH | Refills: 0 | Status: SHIPPED | OUTPATIENT
Start: 2024-12-17

## 2024-12-17 NOTE — PROGRESS NOTES
Specialty Pharmacy Refill Coordination Note     Corby is a 39 y.o. female contacted today regarding refills of Aimovig specialty medication(s).    Reviewed and verified with patient:       Specialty medication(s) and dose(s) confirmed: yes    Refill Questions      Flowsheet Row Most Recent Value   Changes to allergies? No   Changes to medications? No   New conditions or infections since last clinic visit No   Unplanned office visit, urgent care, ED, or hospital admission in the last 4 weeks  No   How does patient/caregiver feel medication is working? Very good   Financial problems or insurance changes  No   Since the previous refill, were any specialty medication doses or scheduled injections missed or delayed?  No   Does this patient require a clinical escalation to a pharmacist? No            Delivery Questions      Flowsheet Row Most Recent Value   Delivery method UPS   Delivery address verified with patient/caregiver? Yes   Delivery address Home   Number of medications in delivery 1   Medication(s) being filled and delivered Erenumab-aooe (AIMOVIG)   Doses left of specialty medications 0   Copay verified? Yes   Copay amount $5   Copay form of payment Credit/debit on file   Ship Date 12/18   Delivery Date 12/19   Signature Required No                   Follow-up: 21 day(s)     Bel Wasserman, Pharmacy Technician  Specialty Pharmacy Technician

## 2025-01-09 ENCOUNTER — SPECIALTY PHARMACY (OUTPATIENT)
Dept: NEUROLOGY | Facility: CLINIC | Age: 40
End: 2025-01-09
Payer: COMMERCIAL

## 2025-01-09 NOTE — PROGRESS NOTES
Specialty Pharmacy Patient Management Program  Neurology Reassessment     Corby Huber is a 39 y.o. female with chronic migraine seen by a Neurology provider and enrolled in the Neurology Patient Management program offered by [facility].  A follow-up outreach was conducted, including assessment of continued therapy appropriateness, medication adherence, and side effect incidence and management for erenumab (Aimovig).     Changes to Insurance Coverage or Financial Support  No changes.       Relevant Past Medical History and Comorbidities  Relevant medical history and concomitant health conditions were discussed with the patient. The patient's chart has been reviewed for relevant past medical history and comorbid health conditions and updated as necessary.   Past Medical History:   Diagnosis Date    Environmental allergies     Fissure, anal     Hypothyroidism     Migraines      Social History     Socioeconomic History    Marital status: Single   Tobacco Use    Smoking status: Never     Passive exposure: Never    Smokeless tobacco: Never   Vaping Use    Vaping status: Never Used   Substance and Sexual Activity    Alcohol use: Never    Drug use: Never    Sexual activity: Defer     Problem list reviewed by Timo Puentes RPH on 1/9/2025 at  9:35 AM      Hospitalizations and Urgent Care Since Last Assessment  ED Visits, Admissions, or Hospitalizations: none.   Urgent Office Visits: none.       Allergies  Known allergies and reactions were discussed with the patient. The patient's chart has been reviewed for allergy information and updated as necessary.   No Known Allergies  Allergies reviewed by Timo Puentes RPH on 1/9/2025 at  9:35 AM      Relevant Laboratory Values  Common labs          1/28/2024    12:37 5/6/2024    10:43   Common Labs   Glucose 102     BUN 12     Creatinine 0.74     Sodium 139     Potassium 4.1     Chloride 103     Calcium 9.4     Albumin 4.7     Total Bilirubin 0.3      Alkaline Phosphatase 80     AST (SGOT) 19     ALT (SGPT) 20     WBC 4.09  4.54       Hemoglobin 14.6  13.6       Hematocrit 44.7  43.3       Platelets 197  216       Hemoglobin A1C  4.9          Details          This result is from an external source.               Lab Assessment  The above labs have been reviewed. No dose adjustments are needed for the specialty medication(s) based on the labs.       Current Medication List  This medication list has been reviewed with the patient and evaluated for any interactions or necessary modifications/recommendations, and updated to include all prescription medications, OTC medications, and supplements the patient is currently taking.  This list reflects what is contained in the patient's profile, which has also been marked as reviewed to communicate to other providers it is the most up to date version of the patient's current medication therapy.     Current Outpatient Medications:     albuterol sulfate  (90 Base) MCG/ACT inhaler, Inhale 2 puffs Every 4 (Four) Hours As Needed for Wheezing., Disp: 90 g, Rfl: 0    azithromycin (ZITHROMAX) 250 MG tablet, Take 2 tablets the first day, then 1 tablet daily for 4 days. (Patient not taking: Reported on 11/14/2024), Disp: 6 tablet, Rfl: 0    Butalbital-APAP-Caff-Cod -18-30 MG capsule, Take 1 tablet by mouth 3 (Three) Times a Day As Needed (headache). (Patient not taking: Reported on 11/14/2024), Disp: 30 capsule, Rfl: 0    Dihydroergotamine Mesylate HFA (Trudhesa) 0.725 MG/ACT aerosol solution, 2 sprays into the nostril(s) as directed by provider Daily As Needed (migraine). (Patient not taking: Reported on 11/14/2024), Disp: 4 mL, Rfl: 5    diphenhydrAMINE (BENADRYL) 25 mg capsule, Take 1 capsule by mouth As Needed. (Patient not taking: Reported on 11/14/2024), Disp: , Rfl:     diphenhydrAMINE (BENADRYL) 25 MG tablet, Take 1 tablet by mouth Every 6 (Six) Hours As Needed (headache). (Patient not taking: Reported on  11/14/2024), Disp: 10 tablet, Rfl: 0    Erenumab-aooe (AIMOVIG) 140 MG/ML auto-injector, Inject 1 mL under the skin into the appropriate area as directed Every 30 (Thirty) Days., Disp: 1 mL, Rfl: 11    Ibuprofen (ADVIL MIGRAINE PO), Take 1 tablet by mouth Daily As Needed. (Patient not taking: Reported on 11/14/2024), Disp: , Rfl:     methylPREDNISolone (MEDROL) 4 MG dose pack, Take as directed on package instructions., Disp: 1 each, Rfl: 0    metoclopramide (REGLAN) 10 MG tablet, Take 1 tablet by mouth 3 (Three) Times a Day As Needed (Headache). (Patient not taking: Reported on 11/14/2024), Disp: 12 tablet, Rfl: 0    multivitamin (THERAGRAN) tablet tablet, Take  by mouth Daily., Disp: , Rfl:     promethazine (PHENERGAN) 50 MG tablet, Take 0.5 tablets by mouth Every 6 (Six) Hours As Needed for Nausea or Vomiting. (Patient not taking: Reported on 11/14/2024), Disp: 12 tablet, Rfl: 1    rizatriptan (Maxalt) 10 MG tablet, Take 1 tablet by mouth 1 (One) Time As Needed for Migraine. May repeat in 2 hours if needed. Max of 2 tablets in 24 hours. (Patient not taking: Reported on 11/14/2024), Disp: 12 tablet, Rfl: 2    Synthroid 137 MCG tablet, Take 1 tablet by mouth Daily., Disp: , Rfl:     topiramate (Topamax) 25 MG tablet, Take 2 tablets by mouth Every Night. (Patient not taking: Reported on 11/14/2024), Disp: 60 tablet, Rfl: 5    vitamin D (ERGOCALCIFEROL) 1.25 MG (47420 UT) capsule capsule, Take 1 capsule by mouth Every 7 (Seven) Days., Disp: , Rfl:     white petrolatum-dilTIAZem, Apply to area at least 3 times per day (Patient not taking: Reported on 11/14/2024), Disp: 30 g, Rfl: 3    Medicines reviewed by Timo Puentes RP on 1/9/2025 at  9:35 AM    Drug Interactions  None identified.       Adverse Drug Reactions  Medication tolerability: Tolerating with no to minimal ADRs  Medication plan: Continue therapy with normal follow-up  Plan for ADR Management: N/a, no ADR's      Adherence, Self-Administration, and  Current Therapy Problems  Adherence related patient's specialty therapy was discussed with the patient. The Adherence segment of this outreach has been reviewed and updated.   Adherence Questions  Linked Medication(s) Assessed: Erenumab-aooe (AIMOVIG)  On average, how many doses/injections does the patient miss per month?: 0  What are the identified reasons for non-adherence or missed doses? : no problems identified  What is the estimated medication adherence level?: %  Based on the patient/caregiver response and refill history, does this patient require an MTP to track adherence improvements?: no    Additional Barriers to Patient Self-Administration: none.  Methods for Supporting Patient Self-Administration: no further support needed at this time.      Recently Close Medication Therapy Problems  No medication therapy recommendations to display  Open Medication Therapy Problems  No medication therapy recommendations to display     Goals of Therapy  Goals related to the patient's specialty therapy was discussed with the patient. The Patient Goals segment of this outreach has been reviewed and updated.    Goals Addressed Today        Specialty Pharmacy General Goal      Reduce/maintain reduced frequency and severity of migraines. Patient reports that she was experiencing daily headaches as of around 8/2021 prior to initiating preventive therapy for migraines. As of 6/1/23, patient reports having 2-3 headache days per month that are mild in nature.     1/9/25 clinical reassessment: Patient reports no significant changes since last assessment from 7/17/24 with about 2-3 headache days per month.     1/8/24 clinical reassessment: Patient reports the same 2-3 headache days per month on average. She did have a slight increase in headache days during December 2023 which she attributes to weather and the stress of the holidays. She anticipates that this will return to her normal over the next month but knows to call if  it doesn't or if frequency or severity worsen over time.                Quality of Life Assessment   Quality of Life related to the patient's enrollment in the patient management program and services provided was discussed with the patient. The QOL segment of this outreach has been reviewed and updated.   Quality of Life Improvement Scale: 8-Moderately better      Reassessment Plan & Follow-Up  Medication Therapy Changes: No changes, continuing erenumab for prevention and rizatriptan and Trudhesa for acute treatment of migraines.   Related Plans, Therapy Recommendations, or Issues to Be Addressed: Nothing further to be addressed at this time.   Pharmacist to perform regular reassessments no more than (6) months from the previous assessment.  Care Coordinator to set up future refill outreaches, coordinate prescription delivery, and escalate clinical questions to pharmacist.     Attestation  Therapeutic appropriateness: Appropriate  I attest the patient was actively involved in and has agreed to the above plan of care. If the prescribed therapy is at any point deemed not appropriate based on the current or future assessments, a consultation will be initiated with the patient's specialty care provider to determine the best course of action. The revised plan of therapy will be documented along with any additional patient education provided. Discussed aforementioned material with patient by phone.    Timo Puentes, Cynthia, [facility]  Clinic Specialty Pharmacist, Neurology  1/9/2025  09:38 EST

## 2025-01-09 NOTE — PROGRESS NOTES
Specialty Pharmacy Refill Coordination Note     Corby is a 39 y.o. female contacted today regarding refills of her specialty medication(s).    Specialty medication(s) and dose(s) confirmed: yes  Changes to medications: no  Changes to insurance: no  Reviewed and verified with patient:  Allergies  Meds  Problems         Refill Questions      Flowsheet Row Most Recent Value   Changes to allergies? No   Changes to medications? No   New conditions or infections since last clinic visit No   Unplanned office visit, urgent care, ED, or hospital admission in the last 4 weeks  No   How does patient/caregiver feel medication is working? Very good   Financial problems or insurance changes  No   Since the previous refill, were any specialty medication doses or scheduled injections missed or delayed?  No   Does this patient require a clinical escalation to a pharmacist? No            Delivery Questions      Flowsheet Row Most Recent Value   Delivery method UPS   Delivery address verified with patient/caregiver? Yes   Delivery address Home   Number of medications in delivery 1   Medication(s) being filled and delivered Erenumab-aooe (AIMOVIG)   Doses left of specialty medications 0   Copay verified? Yes   Copay amount $5   Copay form of payment Credit/debit on file   Ship Date 1/9/25   Delivery Date 1/10/25   Signature Required No                 Follow-up: 3 weeks     Timo Puentes RPH  1/9/2025   09:38 EST

## 2025-02-07 ENCOUNTER — SPECIALTY PHARMACY (OUTPATIENT)
Dept: NEUROLOGY | Facility: CLINIC | Age: 40
End: 2025-02-07
Payer: COMMERCIAL

## 2025-02-07 NOTE — PROGRESS NOTES
Specialty Pharmacy Refill Coordination Note     Corby is a 39 y.o. female contacted today regarding refills of Aimovig specialty medication(s).    Reviewed and verified with patient:       Specialty medication(s) and dose(s) confirmed: yes    Refill Questions      Flowsheet Row Most Recent Value   Changes to allergies? No   Changes to medications? No   New conditions or infections since last clinic visit No   Unplanned office visit, urgent care, ED, or hospital admission in the last 4 weeks  No   How does patient/caregiver feel medication is working? Very good   Financial problems or insurance changes  No   Since the previous refill, were any specialty medication doses or scheduled injections missed or delayed?  No   Does this patient require a clinical escalation to a pharmacist? No            Delivery Questions      Flowsheet Row Most Recent Value   Delivery method UPS   Delivery address verified with patient/caregiver? Yes   Delivery address Home   Number of medications in delivery 1   Medication(s) being filled and delivered Erenumab-aooe (AIMOVIG)   Doses left of specialty medications 0   Copay verified? Yes   Copay amount $5   Copay form of payment Credit/debit on file   Ship Date 2/10   Delivery Date Selection 02/11/25   Signature Required No                   Follow-up: 21 day(s)     Bel Wasserman, Pharmacy Technician  Specialty Pharmacy Technician

## 2025-03-07 ENCOUNTER — SPECIALTY PHARMACY (OUTPATIENT)
Dept: NEUROLOGY | Facility: CLINIC | Age: 40
End: 2025-03-07
Payer: COMMERCIAL

## 2025-03-07 NOTE — PROGRESS NOTES
Specialty Pharmacy Patient Management Program  Refill Outreach     Amal was contacted today regarding refills of their medication(s).    Refill Questions      Flowsheet Row Most Recent Value   Changes to allergies? No   Changes to medications? No   New conditions or infections since last clinic visit No   Unplanned office visit, urgent care, ED, or hospital admission in the last 4 weeks  No   How does patient/caregiver feel medication is working? Good   Financial problems or insurance changes  No   Since the previous refill, were any specialty medication doses or scheduled injections missed or delayed?  No   Does this patient require a clinical escalation to a pharmacist? No            Delivery Questions      Flowsheet Row Most Recent Value   Delivery method UPS   Delivery address verified with patient/caregiver? Yes   Delivery address Home   Number of medications in delivery 1   Medication(s) being filled and delivered Erenumab-aooe (AIMOVIG)   Doses left of specialty medications 0   Copay verified? Yes   Copay amount $5   Copay form of payment Credit/debit on file   Delivery Date Selection 03/11/25   Signature Required No                 Follow-up: 21 day(s)     Bel Wasserman, Pharmacy Technician  3/7/2025  12:38 EST

## 2025-03-12 ENCOUNTER — OFFICE VISIT (OUTPATIENT)
Dept: FAMILY MEDICINE CLINIC | Facility: CLINIC | Age: 40
End: 2025-03-12
Payer: COMMERCIAL

## 2025-03-12 VITALS
WEIGHT: 230 LBS | SYSTOLIC BLOOD PRESSURE: 110 MMHG | RESPIRATION RATE: 16 BRPM | BODY MASS INDEX: 34.07 KG/M2 | HEIGHT: 69 IN | OXYGEN SATURATION: 99 % | DIASTOLIC BLOOD PRESSURE: 68 MMHG | TEMPERATURE: 98.6 F | HEART RATE: 74 BPM

## 2025-03-12 DIAGNOSIS — M25.571 PAIN IN JOINTS OF BOTH FEET: ICD-10-CM

## 2025-03-12 DIAGNOSIS — M25.572 PAIN IN JOINTS OF BOTH FEET: ICD-10-CM

## 2025-03-12 DIAGNOSIS — E06.3 HASHIMOTO'S DISEASE: ICD-10-CM

## 2025-03-12 DIAGNOSIS — R63.5 WEIGHT GAIN: Primary | ICD-10-CM

## 2025-03-12 DIAGNOSIS — R20.2 PARESTHESIA: ICD-10-CM

## 2025-03-12 PROCEDURE — 99214 OFFICE O/P EST MOD 30 MIN: CPT | Performed by: INTERNAL MEDICINE

## 2025-03-12 NOTE — PROGRESS NOTES
Subjective   Corby Huber is a 39 y.o. female.     Chief Complaint   Patient presents with    Weight Gain    Fatigue    Foot Swelling       Fatigue  Symptoms include fatigue.    Pertinent negative symptoms include no chest pain and no fever.      Patient here for follow-up for hyperlipidemia, complaints of weight gain, continued fatigue, foot swelling and paresthesias.  Patient continues to gain weight, he abruptly has gained 10 pounds.  She has Hashimoto's followed by Dr. Harding  Patient also has migraine headaches sees neurologist.  She had MRIs of her back before.    The following portions of the patient's history were reviewed and updated as appropriate: allergies, current medications, past family history, past medical history, past social history, past surgical history and problem list.    Review of Systems   Constitutional:  Positive for fatigue. Negative for activity change, appetite change and fever.   Eyes:  Negative for blurred vision and double vision.   Respiratory:  Negative for shortness of breath.    Cardiovascular:  Negative for chest pain, palpitations and leg swelling.   Gastrointestinal: Negative.    Musculoskeletal:  Positive for arthralgias and joint swelling.   Neurological:  Negative for dizziness, syncope, light-headedness and headache.   Psychiatric/Behavioral: Negative.         No Known Allergies    Current Outpatient Medications on File Prior to Visit   Medication Sig Dispense Refill    albuterol sulfate  (90 Base) MCG/ACT inhaler Inhale 2 puffs Every 4 (Four) Hours As Needed for Wheezing. 90 g 0    Butalbital-APAP-Caff-Cod -24-30 MG capsule Take 1 tablet by mouth 3 (Three) Times a Day As Needed (headache). 30 capsule 0    Dihydroergotamine Mesylate HFA (Trudhesa) 0.725 MG/ACT aerosol solution 2 sprays into the nostril(s) as directed by provider Daily As Needed (migraine). 4 mL 5    diphenhydrAMINE (BENADRYL) 25 mg capsule Take 1 capsule by mouth As Needed.      Erenumab-aooe  (AIMOVIG) 140 MG/ML auto-injector Inject 1 mL under the skin into the appropriate area as directed Every 30 (Thirty) Days. 1 mL 11    Ibuprofen (ADVIL MIGRAINE PO) Take 1 tablet by mouth Daily As Needed.      rizatriptan (Maxalt) 10 MG tablet Take 1 tablet by mouth 1 (One) Time As Needed for Migraine. May repeat in 2 hours if needed. Max of 2 tablets in 24 hours. 12 tablet 2    Synthroid 137 MCG tablet Take 1 tablet by mouth Daily.      azithromycin (ZITHROMAX) 250 MG tablet Take 2 tablets the first day, then 1 tablet daily for 4 days. (Patient not taking: Reported on 11/14/2024) 6 tablet 0    diphenhydrAMINE (BENADRYL) 25 MG tablet Take 1 tablet by mouth Every 6 (Six) Hours As Needed (headache). (Patient not taking: Reported on 11/14/2024) 10 tablet 0    methylPREDNISolone (MEDROL) 4 MG dose pack Take as directed on package instructions. 1 each 0    metoclopramide (REGLAN) 10 MG tablet Take 1 tablet by mouth 3 (Three) Times a Day As Needed (Headache). (Patient not taking: Reported on 3/12/2025) 12 tablet 0    multivitamin (THERAGRAN) tablet tablet Take  by mouth Daily. (Patient not taking: Reported on 3/12/2025)      promethazine (PHENERGAN) 50 MG tablet Take 0.5 tablets by mouth Every 6 (Six) Hours As Needed for Nausea or Vomiting. (Patient not taking: Reported on 3/12/2025) 12 tablet 1    topiramate (Topamax) 25 MG tablet Take 2 tablets by mouth Every Night. (Patient not taking: Reported on 11/14/2024) 60 tablet 5    vitamin D (ERGOCALCIFEROL) 1.25 MG (98733 UT) capsule capsule Take 1 capsule by mouth Every 7 (Seven) Days. (Patient not taking: Reported on 3/12/2025)      white petrolatum-dilTIAZem Apply to area at least 3 times per day (Patient not taking: Reported on 11/9/2023) 30 g 3     No current facility-administered medications on file prior to visit.       Family History   Problem Relation Age of Onset    Liver disease Father     Diabetes Maternal Uncle     Heart disease Maternal Uncle     Hypertension  "Maternal Uncle     Arthritis Maternal Grandmother     Heart disease Maternal Grandmother     Hypertension Maternal Grandmother     Seizures Maternal Grandfather        Past Medical History:   Diagnosis Date    Environmental allergies     Fissure, anal     Hypothyroidism     Migraines        Past Surgical History:   Procedure Laterality Date    APPENDECTOMY N/A 11/23/2022    Procedure: APPENDECTOMY LAPAROSCOPIC;  Surgeon: Mildred Munroe MD;  Location: [facility] OR;  Service: General;  Laterality: N/A;    FOOT SURGERY N/A 01/2022    HEMORRHOIDECTOMY  May 2010       Social History     Socioeconomic History    Marital status: Single   Tobacco Use    Smoking status: Never     Passive exposure: Never    Smokeless tobacco: Never   Vaping Use    Vaping status: Never Used   Substance and Sexual Activity    Alcohol use: Never    Drug use: Never    Sexual activity: Defer       Patient Active Problem List   Diagnosis    Ankle injury, left, initial encounter    Benign neoplasm of cerebral meninges    Lumbar disc herniation with radiculopathy    Lumbar spondylosis with myelopathy    Hashimoto's disease    Hypothyroidism    Intractable migraine without aura and without status migrainosus    Vitamin D deficiency    Periodic headache syndrome, not intractable    Acute appendicitis, unspecified acute appendicitis type       /68   Pulse 74   Temp 98.6 °F (37 °C)   Resp 16   Ht 175.3 cm (69.02\")   Wt 104 kg (230 lb)   SpO2 99%   BMI 33.95 kg/m²   Body mass index is 33.95 kg/m².    Objective   Physical Exam  Vitals and nursing note reviewed.   Constitutional:       Appearance: She is well-developed.   Eyes:      Pupils: Pupils are equal, round, and reactive to light.   Neck:      Thyroid: No thyromegaly.      Vascular: No JVD.      Trachea: No tracheal deviation.   Cardiovascular:      Rate and Rhythm: Normal rate and regular rhythm.      Heart sounds: No murmur heard.  Pulmonary:      Effort: Pulmonary effort is " normal. No respiratory distress.      Breath sounds: Normal breath sounds. No wheezing.   Abdominal:      General: Bowel sounds are normal. There is no distension.      Palpations: Abdomen is soft. There is no mass.      Tenderness: There is no abdominal tenderness. There is no right CVA tenderness, left CVA tenderness, guarding or rebound.      Hernia: No hernia is present.   Musculoskeletal:         General: No swelling or tenderness.      Cervical back: Normal range of motion and neck supple.   Lymphadenopathy:      Cervical: No cervical adenopathy.   Neurological:      General: No focal deficit present.      Mental Status: She is alert and oriented to person, place, and time.   Psychiatric:         Mood and Affect: Mood normal.           Assessment & Plan   Diagnoses and all orders for this visit:    1. Weight gain (Primary)  -     CBC & Differential  -     Comprehensive Metabolic Panel  -     Lipid Panel With / Chol / HDL Ratio  -     TSH  -     T4, Free  -     Vitamin B12  -     Folate  -     Thyroid Peroxidase Antibody  -     Cortisol; Future  -     T3, free; Future  -     Rheumatoid Factor  -     C-reactive protein  -     LAURENT  -     Uric acid    2. Hashimoto's disease  -     CBC & Differential  -     Comprehensive Metabolic Panel  -     Lipid Panel With / Chol / HDL Ratio  -     TSH  -     T4, Free  -     Vitamin B12  -     Folate  -     Thyroid Peroxidase Antibody  -     Cortisol; Future  -     T3, free; Future  -     Rheumatoid Factor  -     C-reactive protein  -     LAURENT  -     Uric acid    3. Paresthesia  -     CBC & Differential  -     Comprehensive Metabolic Panel  -     Lipid Panel With / Chol / HDL Ratio  -     TSH  -     T4, Free  -     Vitamin B12  -     Folate  -     Thyroid Peroxidase Antibody  -     Cortisol; Future  -     T3, free; Future  -     Rheumatoid Factor  -     C-reactive protein  -     LAURENT  -     Uric acid    4. Pain in joints of both feet  -     CBC & Differential  -     Comprehensive  Metabolic Panel  -     Lipid Panel With / Chol / HDL Ratio  -     TSH  -     T4, Free  -     Vitamin B12  -     Folate  -     Thyroid Peroxidase Antibody  -     Cortisol; Future  -     T3, free; Future  -     Rheumatoid Factor  -     C-reactive protein  -     LAURENT  -     Uric acid    Continue diet and exercise.  Try to lose weight.  Keep follow-up with specialist.  Continue current medication of we will refill all, albuterol, and will wake Synthroid, Topamax, vitamin D.  Draw extensive lab work.  Patient aware if LAURENT comes back positive it could be false.  If needed we may have to send to rheumatologist if positive.  Otherwise keep follow-up with specialist.  Continue to monitor weight.  I will see her back in 3 to 6 months time.  EHR dragon/transcription disclaimer:  Part of this note may be an electronic transcription/translation of spoken language to printed text using the Dragon Dictation System.

## 2025-03-12 NOTE — PROGRESS NOTES
Venipuncture Blood Specimen Collection  Venipuncture performed in left arm by Annette Amador MA with good hemostasis. Patient tolerated the procedure well without complications.   03/12/25   Annette Amador MA

## 2025-03-13 LAB
ALBUMIN SERPL-MCNC: 4.7 G/DL (ref 3.5–5.2)
ALBUMIN/GLOB SERPL: 1.5 G/DL
ALP SERPL-CCNC: 92 U/L (ref 39–117)
ALT SERPL-CCNC: 21 U/L (ref 1–33)
AST SERPL-CCNC: 22 U/L (ref 1–32)
BASOPHILS # BLD AUTO: 0 10*3/MM3 (ref 0–0.2)
BASOPHILS NFR BLD AUTO: 0 % (ref 0–1.5)
BILIRUB SERPL-MCNC: 0.4 MG/DL (ref 0–1.2)
BUN SERPL-MCNC: 10 MG/DL (ref 6–20)
BUN/CREAT SERPL: 12.8 (ref 7–25)
CALCIUM SERPL-MCNC: 9.9 MG/DL (ref 8.6–10.5)
CHLORIDE SERPL-SCNC: 102 MMOL/L (ref 98–107)
CHOLEST SERPL-MCNC: 237 MG/DL (ref 0–200)
CHOLEST/HDLC SERPL: 4.47 {RATIO}
CO2 SERPL-SCNC: 26.5 MMOL/L (ref 22–29)
CREAT SERPL-MCNC: 0.78 MG/DL (ref 0.57–1)
EGFRCR SERPLBLD CKD-EPI 2021: 98.6 ML/MIN/1.73
EOSINOPHIL # BLD AUTO: 0 10*3/MM3 (ref 0–0.4)
EOSINOPHIL NFR BLD AUTO: 0 % (ref 0.3–6.2)
ERYTHROCYTE [DISTWIDTH] IN BLOOD BY AUTOMATED COUNT: 11.9 % (ref 12.3–15.4)
FOLATE SERPL-MCNC: >20 NG/ML (ref 4.78–24.2)
GLOBULIN SER CALC-MCNC: 3.1 GM/DL
GLUCOSE SERPL-MCNC: 97 MG/DL (ref 65–99)
HCT VFR BLD AUTO: 44.7 % (ref 34–46.6)
HDLC SERPL-MCNC: 53 MG/DL (ref 40–60)
HGB BLD-MCNC: 14.6 G/DL (ref 12–15.9)
IMM GRANULOCYTES # BLD AUTO: 0.01 10*3/MM3 (ref 0–0.05)
IMM GRANULOCYTES NFR BLD AUTO: 0.2 % (ref 0–0.5)
LDLC SERPL CALC-MCNC: 149 MG/DL (ref 0–100)
LYMPHOCYTES # BLD AUTO: 1.49 10*3/MM3 (ref 0.7–3.1)
LYMPHOCYTES NFR BLD AUTO: 32.3 % (ref 19.6–45.3)
MCH RBC QN AUTO: 29.6 PG (ref 26.6–33)
MCHC RBC AUTO-ENTMCNC: 32.7 G/DL (ref 31.5–35.7)
MCV RBC AUTO: 90.5 FL (ref 79–97)
MONOCYTES # BLD AUTO: 0.35 10*3/MM3 (ref 0.1–0.9)
MONOCYTES NFR BLD AUTO: 7.6 % (ref 5–12)
NEUTROPHILS # BLD AUTO: 2.76 10*3/MM3 (ref 1.7–7)
NEUTROPHILS NFR BLD AUTO: 59.9 % (ref 42.7–76)
NRBC BLD AUTO-RTO: 0 /100 WBC (ref 0–0.2)
PLATELET # BLD AUTO: 250 10*3/MM3 (ref 140–450)
POTASSIUM SERPL-SCNC: 4.4 MMOL/L (ref 3.5–5.2)
PROT SERPL-MCNC: 7.8 G/DL (ref 6–8.5)
RBC # BLD AUTO: 4.94 10*6/MM3 (ref 3.77–5.28)
SODIUM SERPL-SCNC: 140 MMOL/L (ref 136–145)
T4 FREE SERPL-MCNC: 1.13 NG/DL (ref 0.92–1.68)
THYROPEROXIDASE AB SERPL-ACNC: 118 IU/ML (ref 0–34)
TRIGL SERPL-MCNC: 194 MG/DL (ref 0–150)
TSH SERPL DL<=0.005 MIU/L-ACNC: 3.66 UIU/ML (ref 0.27–4.2)
VIT B12 SERPL-MCNC: 780 PG/ML (ref 211–946)
VLDLC SERPL CALC-MCNC: 35 MG/DL (ref 5–40)
WBC # BLD AUTO: 4.61 10*3/MM3 (ref 3.4–10.8)

## 2025-03-17 LAB
ANA SER QL: POSITIVE
CRP SERPL-MCNC: <0.3 MG/DL (ref 0–0.5)
DSDNA AB SER-ACNC: 14 IU/ML (ref 0–9)
Lab: ABNORMAL
RHEUMATOID FACT SERPL-ACNC: 12.2 IU/ML
URATE SERPL-MCNC: 5.5 MG/DL (ref 2.4–5.7)
WRITTEN AUTHORIZATION: NORMAL

## 2025-03-20 RX ORDER — ALBUTEROL SULFATE 90 UG/1
2 INHALANT RESPIRATORY (INHALATION)
Qty: 54 G | Refills: 1 | Status: SHIPPED | OUTPATIENT
Start: 2025-03-20

## 2025-04-07 ENCOUNTER — SPECIALTY PHARMACY (OUTPATIENT)
Age: 40
End: 2025-04-07
Payer: COMMERCIAL

## 2025-04-07 NOTE — PROGRESS NOTES
Specialty Pharmacy Patient Management Program  Refill Outreach     Amal was contacted today regarding refills of their medication(s).    Refill Questions      Flowsheet Row Most Recent Value   Changes to allergies? No   Changes to medications? No   New conditions or infections since last clinic visit No   Unplanned office visit, urgent care, ED, or hospital admission in the last 4 weeks  No   How does patient/caregiver feel medication is working? Very good   Financial problems or insurance changes  No   Since the previous refill, were any specialty medication doses or scheduled injections missed or delayed?  No   Does this patient require a clinical escalation to a pharmacist? No            Delivery Questions      Flowsheet Row Most Recent Value   Delivery method UPS   Delivery address verified with patient/caregiver? Yes   Delivery address Home   Number of medications in delivery 1   Medication(s) being filled and delivered Erenumab-aooe (AIMOVIG)   Doses left of specialty medications 1   Copay verified? Yes   Copay amount $5.00   Copay form of payment Credit/debit on file   Delivery Date Selection 04/08/25   Signature Required No                 Follow-up: 30 day(s)     Tracy Carlin, Pharmacy Technician  4/7/2025  09:36 EDT

## 2025-05-12 ENCOUNTER — SPECIALTY PHARMACY (OUTPATIENT)
Dept: INFUSION THERAPY | Facility: HOSPITAL | Age: 40
End: 2025-05-12
Payer: COMMERCIAL

## 2025-05-12 NOTE — PROGRESS NOTES
Specialty Pharmacy Patient Management Program  Refill Outreach     Amal was contacted today regarding refills of their medication(s).    Refill Questions      Flowsheet Row Most Recent Value   Changes to allergies? No   Changes to medications? No   New conditions or infections since last clinic visit No   Unplanned office visit, urgent care, ED, or hospital admission in the last 4 weeks  No   How does patient/caregiver feel medication is working? Very good   Financial problems or insurance changes  No   Since the previous refill, were any specialty medication doses or scheduled injections missed or delayed?  No   Does this patient require a clinical escalation to a pharmacist? No            Delivery Questions      Flowsheet Row Most Recent Value   Delivery method UPS   Delivery address verified with patient/caregiver? Yes   Delivery address Home   Other address preferred n/a   Number of medications in delivery 1   Medication(s) being filled and delivered Erenumab-aooe (AIMOVIG)   Doses left of specialty medications 0   Copay verified? Yes   Copay amount 5   Copay form of payment Credit/debit on file   Delivery Date Selection 05/13/25   Signature Required No   Do you consent to receive electronic handouts?  Yes                 Follow-up: 25 day(s)     Raya Cheng, Pharmacy Technician  5/12/2025  12:06 EDT

## 2025-06-06 ENCOUNTER — SPECIALTY PHARMACY (OUTPATIENT)
Dept: NEUROLOGY | Facility: CLINIC | Age: 40
End: 2025-06-06
Payer: COMMERCIAL

## 2025-06-06 NOTE — PROGRESS NOTES
Specialty Pharmacy Patient Management Program  [employer] Refill Outreach       Amal was contacted today regarding refills of their medication(s).    [employer] Questionnaire Responses      Flowsheet Row Questionnaire Series Submission from 6/6/2025 in Initial Department with Myesha, Generic Provider   Changes to allergies? No    Changes to medications? Yes    New conditions or infections since last clinic visit No    Unplanned office visit, urgent care, ED, or hospital admission in the last 4 weeks  No    How does patient/caregiver feel medication is working? Good    Financial problems or insurance changes  No    Since the previous refill, were any specialty medication doses or scheduled injections missed or delayed?  No    Delivery address Home    Doses left of specialty medications 0    Copay verified? Yes    Delivery Date Selection 06/10/25             Refill Questions      Flowsheet Row Most Recent Value   Does this patient require a clinical escalation to a pharmacist? Yes            Delivery Questions      Flowsheet Row Most Recent Value   Delivery method UPS   Delivery address verified with patient/caregiver? Yes   Other address preferred n/a   Number of medications in delivery 1   Medication(s) being filled and delivered Erenumab-aooe (AIMOVIG)   Copay verified? Yes   Copay amount 5   Copay form of payment Credit/debit on file   Signature Required No   Do you consent to receive electronic handouts?  Yes                   Follow-up: 25  day(s)     Chanel Nguyen  6/6/2025  13:15 EDT

## 2025-06-18 ENCOUNTER — OFFICE VISIT (OUTPATIENT)
Age: 40
End: 2025-06-18
Payer: COMMERCIAL

## 2025-06-18 VITALS
BODY MASS INDEX: 32.32 KG/M2 | HEIGHT: 69 IN | SYSTOLIC BLOOD PRESSURE: 118 MMHG | DIASTOLIC BLOOD PRESSURE: 80 MMHG | TEMPERATURE: 97.7 F | WEIGHT: 218.2 LBS | HEART RATE: 76 BPM

## 2025-06-18 DIAGNOSIS — I73.00 PRIMARY RAYNAUD'S PHENOMENON: ICD-10-CM

## 2025-06-18 DIAGNOSIS — R76.8 POSITIVE ANA (ANTINUCLEAR ANTIBODY): Primary | ICD-10-CM

## 2025-06-18 DIAGNOSIS — M25.50 POLYARTHRALGIA: ICD-10-CM

## 2025-06-18 DIAGNOSIS — E06.3 HASHIMOTO'S DISEASE: ICD-10-CM

## 2025-06-18 PROCEDURE — 99203 OFFICE O/P NEW LOW 30 MIN: CPT | Performed by: STUDENT IN AN ORGANIZED HEALTH CARE EDUCATION/TRAINING PROGRAM

## 2025-06-18 NOTE — PROGRESS NOTES
RHEUMATOLOGY NEW PATIENT VISIT  2025  Patient Name: Corby Huber : 1985 Medical Record: [id number]    PCP: Sandra Prasad MD    Referring provider: Sandra Prasad      REASON FOR CONSULTATION  Evaluation for a positive LAURENT    History of Present Illness    Corby Huber is a 39 y.o. female who presents for evaluation of a positive LAURENT.    She has been diagnosed with hypothyroidism (secondary to Hashimoto's disease) since .     In 2025, she sought medical attention due to generalized body pain, leading to a comprehensive panel of tests.      Test showed LAURENT positive direct, slight elevated anti-dsDNA reflex (14).  TPO-118 (232.5, 274.9, 175, 163)    Review of her rheumatology system:  She reports no difficulty swallowing, eye inflammation, redness, or pain.  She denies Malar  rash, Mouth ulcer, Photosensitivity, Discoid lupus, Raynaud's phenomenon, Pleuritic chest pain, Headache, Hematuria, Psychosis, seizures, Vasculitic rash, Urinary symptoms, Red eye (uveitis), Psoriasis, Recurrent/previous infections: gastroenteritis, STDs, tonsillitis, Dysphagia, IBD, Inflammatory back pain, Plantar fasciitis/Achilles tendinitis     PAST SURGICAL HISTORY:  She had two surgeries on her left foot in , including bunion removal and extra bone removal from the ankle.    SOCIAL HISTORY  She does not smoke or drink alcohol. She is not .     Results  Labs-3/12/2025  CRP, uric acid levels, RF-within normal limit  LAURENT positive direct, slight elevated anti-dsDNA reflex (14).  TPO-118 (232.5, 274.9, 175, 163)  Pert. Img: 3/4/2024  X-ray right-No acute fracture or subluxation of the right hand is seen. There is no  significant degenerative change.       Current Outpatient Medications:     Butalbital-APAP-Caff-Cod -47-30 MG capsule, Take 1 tablet by mouth 3 (Three) Times a Day As Needed (headache)., Disp: 30 capsule, Rfl: 0    Dihydroergotamine Mesylate HFA (Trudhesa) 0.725 MG/ACT aerosol solution, 2  sprays into the nostril(s) as directed by provider Daily As Needed (migraine)., Disp: 4 mL, Rfl: 5    diphenhydrAMINE (BENADRYL) 25 mg capsule, Take 1 capsule by mouth As Needed., Disp: , Rfl:     Erenumab-aooe (AIMOVIG) 140 MG/ML auto-injector, Inject 1 mL under the skin into the appropriate area as directed Every 30 (Thirty) Days., Disp: 1 mL, Rfl: 11    Ibuprofen (ADVIL MIGRAINE PO), Take 1 tablet by mouth Daily As Needed., Disp: , Rfl:     rizatriptan (Maxalt) 10 MG tablet, Take 1 tablet by mouth 1 (One) Time As Needed for Migraine. May repeat in 2 hours if needed. Max of 2 tablets in 24 hours., Disp: 12 tablet, Rfl: 2    Synthroid 137 MCG tablet, Take 125 mcg by mouth Daily., Disp: , Rfl:     vitamin D (ERGOCALCIFEROL) 1.25 MG (99665 UT) capsule capsule, Take 1 capsule by mouth Every 7 (Seven) Days., Disp: , Rfl:     albuterol sulfate  (90 Base) MCG/ACT inhaler, TAKE 2 PUFFS BY MOUTH EVERY 4 HOURS AS NEEDED FOR WHEEZE (Patient not taking: Reported on 6/18/2025), Disp: 54 g, Rfl: 1    azithromycin (ZITHROMAX) 250 MG tablet, Take 2 tablets the first day, then 1 tablet daily for 4 days. (Patient not taking: Reported on 6/18/2025), Disp: 6 tablet, Rfl: 0    diphenhydrAMINE (BENADRYL) 25 MG tablet, Take 1 tablet by mouth Every 6 (Six) Hours As Needed (headache). (Patient not taking: Reported on 11/14/2024), Disp: 10 tablet, Rfl: 0    methylPREDNISolone (MEDROL) 4 MG dose pack, Take as directed on package instructions. (Patient not taking: Reported on 6/18/2025), Disp: 1 each, Rfl: 0    metoclopramide (REGLAN) 10 MG tablet, Take 1 tablet by mouth 3 (Three) Times a Day As Needed (Headache). (Patient not taking: Reported on 11/14/2024), Disp: 12 tablet, Rfl: 0    multivitamin (THERAGRAN) tablet tablet, Take  by mouth Daily. (Patient not taking: Reported on 3/12/2025), Disp: , Rfl:     promethazine (PHENERGAN) 50 MG tablet, Take 0.5 tablets by mouth Every 6 (Six) Hours As Needed for Nausea or Vomiting. (Patient  "not taking: Reported on 11/14/2024), Disp: 12 tablet, Rfl: 1    topiramate (Topamax) 25 MG tablet, Take 2 tablets by mouth Every Night. (Patient not taking: Reported on 11/14/2024), Disp: 60 tablet, Rfl: 5    white petrolatum-dilTIAZem, Apply to area at least 3 times per day (Patient not taking: Reported on 11/9/2023), Disp: 30 g, Rfl: 3     There are no discontinued medications.     Past Medical History:   Diagnosis Date    Environmental allergies     Fissure, anal     Hypothyroidism     Migraines        Past Surgical History:   Procedure Laterality Date    APPENDECTOMY N/A 11/23/2022    Procedure: APPENDECTOMY LAPAROSCOPIC;  Surgeon: Mildred Munroe MD;  Location: [facility];  Service: General;  Laterality: N/A;    FOOT SURGERY N/A 01/2022    HEMORRHOIDECTOMY  May 2010     ALLERGIES    No Known Allergies  Physical Exam       Vitals:    06/18/25 1056   BP: 118/80   BP Location: Left arm   Patient Position: Sitting   Cuff Size: Adult   Pulse: 76   Temp: 97.7 °F (36.5 °C)   TempSrc: Oral   Weight: 99 kg (218 lb 3.2 oz)   Height: 175.3 cm (69.02\")     Gen: Well-developed, well-nourished adult in no apparent distress. Pleasant and cooperative. Alert and oriented.   HEENT: EOMI, MMM, oropharynx clear without oral ulcers, no lacrimal gland enlargement, normal salivary pooling, normal temporal arteries without tenderness or beading.  Chest: Normal work of breathing. CTAB without wheezing/rhonchi/rales. ??  CV: RRR, normal S1/S2, no murmurs/rubs/gallops heard. ??  Extremities: Warm, 2+ distal pulses, no cyanosis, clubbing, or edema.  Neuro: Good comprehension/cognition. Muscle strength 5/5 in all extremities. Gait normal.??  ??Skin: No alopecia, nail change (including no nail pitting), rashes, bruising, petechiae, sclerodactyly, digital pits, telangiectasias, tophi, appreciable calcinosis, or nodules.?? Nailfold capillary exam consistent with Raynaud's.    Comprehensive Musculoskeletal Examination:?  - Jaw, neck " without limited ROM.?  - Shoulders, elbows, wrists, hands/fingers, knees, ankles, feet/toes: No deformity, erythema, warmth, swelling, effusion, tenderness, or limited ROM.??  - Lumbosacral spine and hips without limited ROM.?? Negative BUBBA.    LABS AND IMAGING ll laboratory data was reviewed and relevant values are noted as below.    See pertinent lab comments in HPI.   Assessment & Plan  Corby Huber is a 39 y.o. female who presents for evaluation of a positive LAURENT.  She has been diagnosed with hypothyroidism (secondary to Hashimoto's disease) since 2007/2008.   In 04/2025, she sought medical attention due to generalized body pain, leading to a comprehensive panel of tests.    Test showed LAURENT positive direct, slightly elevated anti-dsDNA reflex (14).  TPO-118 (232.5, 274.9, 175, 163)    Her clinical history and examination do not align with a diagnosis of lupus or Sjogren's syndrome. The presence of significant thyroid antibodies suggests an overlap, which could result in a positive LAURENT test. This is true, when a direct method is used to obtain the test, as it is prone to significant false positive.  There is no concern for lupus or other connective tissue diseases, Sjogren's syndrome, or myositis.     Plan:  - No further investigations or treatments is indicated. Patient education was provided regarding the overlap of thyroid antibodies causing a positive LAURENT test and the importance of monitoring for symptoms indicative of other autoimmune conditions.    - She was advised to monitor her condition and seek medical attention if she experiences hair loss, oral ulcers, dry eyes, dry mouth, skin rashes, proteinuria, pleuritic chest pain, fevers, or recurrent infections.    - She has a primary Raynaud's phenomenon, which is benign and not associated with systemic rheumatic disease.    She was advised to keep her hands warm and avoid cold exposure.    Diagnoses and all orders for this visit:    1. Positive LAURENT  (antinuclear antibody) (Primary)    2. Polyarthralgia    3. Hashimoto's disease    4. Primary Raynaud's phenomenon    Patient or patient representative verbalized consent for the use of Ambient Listening during the visit with  Miles Rodrigues MD for chart documentation.      I spent 40 minutes caring for Corby on this date of service. This time includes time spent by me in the following activities:preparing for the visit, reviewing tests, obtaining and/or reviewing a separately obtained history, performing a medically appropriate examination and/or evaluation , counseling and educating the patient/family/caregiver, documenting information in the medical record, and independently interpreting results and communicating that information with the patient/family/caregiver

## 2025-06-24 PROBLEM — R76.8 POSITIVE ANA (ANTINUCLEAR ANTIBODY): Status: ACTIVE | Noted: 2025-06-24

## 2025-06-24 PROBLEM — M25.50 POLYARTHRALGIA: Status: ACTIVE | Noted: 2025-06-24

## 2025-06-24 PROBLEM — I73.00 PRIMARY RAYNAUD'S PHENOMENON: Status: ACTIVE | Noted: 2025-06-24

## 2025-06-25 NOTE — PATIENT INSTRUCTIONS
Thank you for your new patient with rheumatology  We evaluated for a positive LAURENT test and determined that you do not have clinical features for Lupus.  No additional testing is needed.  You have been educated on symptoms that  could warrant a re-evaluation by a Rheumatologist.  No need for routine Rheumatology follow up at this time.

## 2025-07-01 ENCOUNTER — OFFICE VISIT (OUTPATIENT)
Dept: NEUROLOGY | Facility: CLINIC | Age: 40
End: 2025-07-01
Payer: COMMERCIAL

## 2025-07-01 VITALS
HEIGHT: 69 IN | WEIGHT: 218 LBS | OXYGEN SATURATION: 98 % | BODY MASS INDEX: 32.29 KG/M2 | SYSTOLIC BLOOD PRESSURE: 118 MMHG | DIASTOLIC BLOOD PRESSURE: 70 MMHG | HEART RATE: 78 BPM

## 2025-07-01 DIAGNOSIS — J34.89 RHINORRHEA: Primary | ICD-10-CM

## 2025-07-01 PROCEDURE — 99213 OFFICE O/P EST LOW 20 MIN: CPT | Performed by: NURSE PRACTITIONER

## 2025-07-01 NOTE — PROGRESS NOTES
Subjective   Corby Huber is a 39 y.o. female presenting for follow up. She has a history of migraine headaches, well controlled with Aimovig 140 mg every 30 days. She presents today with her mother. They state she has had clear drainage from her left nostril for a few months. She is not sure how often, at least several days per week. Denies severe headache or history of head trauma. No other symptoms to accompany the drainage.     Migraine    Associated symptoms: no dizziness and no weakness       Review of Systems   Neurological:  Positive for headache. Negative for dizziness, tremors, seizures, syncope, facial asymmetry, speech difficulty, weakness, light-headedness, numbness, memory problem and confusion.     I have reviewed and confirmed the accuracy of the patient's history: Chief complaint, HPI, ROS, Subjective, and Past Family Social History as entered by the MA/LPN/RN. Berenice Barnett MA 07/01/25      Objective     Physical Examination:  General Appearance:  Well developed, well nourished, well groomed, alert, and cooperative.  HEENT: Normocephalic.    Neck and Spine: Normal range of motion.  Normal alignment. No mass or tenderness. No bruits.  Peripheral Vasculature: Radial and pedal pulses are equal and symmetric.   Extremities: No edema or deformities. Normal joint ROM.  Skin: No rashes or birth marks.      Neurological examination:   Higher Integrative Function: Oriented to time, place, and person. Normal registration, recall attention span and concentration. Normal language including comprehension, spontaneous speech, repetition, reading, writing, naming and vocabulary. No neglect with normal visual-spatial function and construction. Normal fund of knowledge and higher integrative function.   CN II: Pupils are equal, round and reactive to light. Normal visual acuity and visual fields.   CN III IV VI: Extraocular movements are full without nystagmus.   CN V: Normal facial sensation and strength of muscles of  mastication.   CN VII: Facial movements are symmetric. No weakness.   CN VIII: Auditory acuity is normal.  CN IX & X: Symmetric palatal movement.   CN XI: Sternocleidomastoid and trapezius are normal. No weakness.   CN XII: The tongue is midline. No atrophy or fasciculations.  Motor: Normal muscle strength, bulk and tone in upper and lower extremities. No fasciculations, rigidity, spasticity, or abnormal movements.   Station and Gait: Normal gait and station.          Assessment & Plan   Diagnoses and all orders for this visit:    1. Rhinorrhea (Primary)    The patient likely has rhinorrhea.  I am not concerned for CSF.  She has no other symptoms including no headache.  Should she experience a severe headache she will call, otherwise I recommended following up with her primary care or ENT if needed.

## 2025-07-02 ENCOUNTER — SPECIALTY PHARMACY (OUTPATIENT)
Dept: NEUROLOGY | Facility: CLINIC | Age: 40
End: 2025-07-02
Payer: COMMERCIAL

## 2025-07-02 NOTE — PROGRESS NOTES
Specialty Pharmacy Refill Coordination Note     Corby is a 39 y.o. female contacted today regarding refills of her specialty medication(s).    Specialty medication(s) and dose(s) confirmed: erenumab 140 mg subcutaneous every 30 days  Changes to medications: no  Changes to insurance: no  Reviewed and verified with patient:  Allergies  Meds  Problems         Refill Questions      Flowsheet Row Most Recent Value   Changes to allergies? No   Changes to medications? No   New conditions or infections since last clinic visit No   Unplanned office visit, urgent care, ED, or hospital admission in the last 4 weeks  No   How does patient/caregiver feel medication is working? Very good   Financial problems or insurance changes  No   Since the previous refill, were any specialty medication doses or scheduled injections missed or delayed?  No   Does this patient require a clinical escalation to a pharmacist? No            Delivery Questions      Flowsheet Row Most Recent Value   Delivery method UPS   Delivery address verified with patient/caregiver? Yes   Delivery address Home   Other address preferred n/a   Number of medications in delivery 1   Medication(s) being filled and delivered Erenumab-aooe (AIMOVIG)   Doses left of specialty medications 0   Copay verified? Yes   Copay amount $5   Copay form of payment Credit/debit on file   Delivery Date Selection 07/08/25   Signature Required No   Do you consent to receive electronic handouts?  Yes                 Follow-up: 3 weeks     Timo Puentes RPH  7/2/2025   14:36 EDT

## 2025-07-02 NOTE — PROGRESS NOTES
Specialty Pharmacy Patient Management Program  Neurology Reassessment     Corby Huber is a 39 y.o. female with chronic migraine seen by a Neurology provider and enrolled in the Neurology Patient Management program offered by [facility].  A follow-up outreach was conducted, including assessment of continued therapy appropriateness, medication adherence, and side effect incidence and management for erenumab (Aimovig).     Changes to Insurance Coverage or Financial Support  No changes.       Relevant Past Medical History and Comorbidities  Relevant medical history and concomitant health conditions were discussed with the patient. The patient's chart has been reviewed for relevant past medical history and comorbid health conditions and updated as necessary.   Past Medical History:   Diagnosis Date    Environmental allergies     Fissure, anal     Hypothyroidism     Migraines      Social History     Socioeconomic History    Marital status: Single   Tobacco Use    Smoking status: Never     Passive exposure: Never    Smokeless tobacco: Never   Vaping Use    Vaping status: Never Used   Substance and Sexual Activity    Alcohol use: Never    Drug use: Never    Sexual activity: Defer     Problem list reviewed by Timo Puentes RPH on 7/2/2025 at  2:35 PM      Hospitalizations and Urgent Care Since Last Assessment  ED Visits, Admissions, or Hospitalizations: none.   Urgent Office Visits: none.       Allergies  Known allergies and reactions were discussed with the patient. The patient's chart has been reviewed for allergy information and updated as necessary.   No Known Allergies  Allergies reviewed by Timo Puentes RPH on 7/2/2025 at  2:35 PM      Relevant Laboratory Values  Common labs          3/12/2025    00:00   Common Labs   Glucose 97    BUN 10    Creatinine 0.78    Sodium 140    Potassium 4.4    Chloride 102    Calcium 9.9    Albumin 4.7    Total Bilirubin 0.4    Alkaline Phosphatase 92     AST (SGOT) 22    ALT (SGPT) 21    WBC 4.61    Hemoglobin 14.6    Hematocrit 44.7    Platelets 250    Total Cholesterol 237    Triglycerides 194    HDL Cholesterol 53    LDL Cholesterol  149    Uric Acid 5.5        Lab Assessment  The above labs have been reviewed. No dose adjustments are needed for the specialty medication(s) based on the labs.       Current Medication List  This medication list has been reviewed with the patient and evaluated for any interactions or necessary modifications/recommendations, and updated to include all prescription medications, OTC medications, and supplements the patient is currently taking.  This list reflects what is contained in the patient's profile, which has also been marked as reviewed to communicate to other providers it is the most up to date version of the patient's current medication therapy.     Current Outpatient Medications:     albuterol sulfate  (90 Base) MCG/ACT inhaler, TAKE 2 PUFFS BY MOUTH EVERY 4 HOURS AS NEEDED FOR WHEEZE (Patient not taking: Reported on 7/1/2025), Disp: 54 g, Rfl: 1    azithromycin (ZITHROMAX) 250 MG tablet, Take 2 tablets the first day, then 1 tablet daily for 4 days. (Patient not taking: Reported on 11/14/2024), Disp: 6 tablet, Rfl: 0    Butalbital-APAP-Caff-Cod -59-30 MG capsule, Take 1 tablet by mouth 3 (Three) Times a Day As Needed (headache)., Disp: 30 capsule, Rfl: 0    Dihydroergotamine Mesylate HFA (Trudhesa) 0.725 MG/ACT aerosol solution, 2 sprays into the nostril(s) as directed by provider Daily As Needed (migraine)., Disp: 4 mL, Rfl: 5    diphenhydrAMINE (BENADRYL) 25 mg capsule, Take 1 capsule by mouth As Needed., Disp: , Rfl:     diphenhydrAMINE (BENADRYL) 25 MG tablet, Take 1 tablet by mouth Every 6 (Six) Hours As Needed (headache)., Disp: 10 tablet, Rfl: 0    Erenumab-aooe (AIMOVIG) 140 MG/ML auto-injector, Inject 1 mL under the skin into the appropriate area as directed Every 30 (Thirty) Days., Disp: 1 mL, Rfl:  11    Ibuprofen (ADVIL MIGRAINE PO), Take 1 tablet by mouth Daily As Needed., Disp: , Rfl:     methylPREDNISolone (MEDROL) 4 MG dose pack, Take as directed on package instructions. (Patient not taking: Reported on 7/1/2025), Disp: 1 each, Rfl: 0    metoclopramide (REGLAN) 10 MG tablet, Take 1 tablet by mouth 3 (Three) Times a Day As Needed (Headache). (Patient not taking: Reported on 7/1/2025), Disp: 12 tablet, Rfl: 0    multivitamin (THERAGRAN) tablet tablet, Take  by mouth Daily. (Patient not taking: Reported on 7/1/2025), Disp: , Rfl:     promethazine (PHENERGAN) 50 MG tablet, Take 0.5 tablets by mouth Every 6 (Six) Hours As Needed for Nausea or Vomiting. (Patient not taking: Reported on 7/1/2025), Disp: 12 tablet, Rfl: 1    rizatriptan (Maxalt) 10 MG tablet, Take 1 tablet by mouth 1 (One) Time As Needed for Migraine. May repeat in 2 hours if needed. Max of 2 tablets in 24 hours., Disp: 12 tablet, Rfl: 2    Synthroid 137 MCG tablet, Take 125 mcg by mouth Daily., Disp: , Rfl:     topiramate (Topamax) 25 MG tablet, Take 2 tablets by mouth Every Night. (Patient not taking: Reported on 11/14/2024), Disp: 60 tablet, Rfl: 5    vitamin D (ERGOCALCIFEROL) 1.25 MG (21429 UT) capsule capsule, Take 1 capsule by mouth Every 7 (Seven) Days., Disp: , Rfl:     white petrolatum-dilTIAZem, Apply to area at least 3 times per day (Patient not taking: Reported on 7/1/2025), Disp: 30 g, Rfl: 3    Medicines reviewed by Timo Puentes [facility] on 7/2/2025 at  2:35 PM    Drug Interactions  None identified.       Adverse Drug Reactions  Medication tolerability: Tolerating with no to minimal ADRs  Medication plan: Continue therapy with normal follow-up  Plan for ADR Management: N/A, no ADR's       Adherence, Self-Administration, and Current Therapy Problems  Adherence related patient's specialty therapy was discussed with the patient. The Adherence segment of this outreach has been reviewed and updated.   Adherence Questions  Linked  Medication(s) Assessed: Erenumab-aooe (AIMOVIG)  On average, how many doses/injections does the patient miss per month?: 0  What are the identified reasons for non-adherence or missed doses? : no problems identified  What is the estimated medication adherence level?: %  Based on the patient/caregiver response and refill history, does this patient require an MTP to track adherence improvements?: no    Additional Barriers to Patient Self-Administration: no barriers.  Methods for Supporting Patient Self-Administration: no further support needed at this time.      Recently Close Medication Therapy Problems  No medication therapy recommendations to display  Open Medication Therapy Problems  No medication therapy recommendations to display     Goals of Therapy  Goals related to the patient's specialty therapy was discussed with the patient. The Patient Goals segment of this outreach has been reviewed and updated.    Goals Addressed Today        Specialty Pharmacy General Goal      Reduce/maintain reduced frequency and severity of migraines. Patient reports that she was experiencing daily headaches as of around 8/2021 prior to initiating preventive therapy for migraines. As of 6/1/23, patient reports having 2-3 headache days per month that are mild in nature.     7/2/25: Patient reports no significant changes since last assessment from 7/17/24 and 1/9/25 with about 2-3 headache days per month.     1/8/24: Patient reports the same 2-3 headache days per month on average. She did have a slight increase in headache days during December 2023 which she attributes to weather and the stress of the holidays. She anticipates that this will return to her normal over the next month but knows to call if it doesn't or if frequency or severity worsen over time.                Quality of Life Assessment   Quality of Life related to the patient's enrollment in the patient management program and services provided was discussed with  the patient. The QOL segment of this outreach has been reviewed and updated.   Quality of Life Improvement Scale: 8-Moderately better      Reassessment Plan & Follow-Up  Medication Therapy Changes: no changes, continuing erenumab for migraine prevention.  Related Plans, Therapy Recommendations, or Issues to Be Addressed: Nothing further to be addressed at this time.   Pharmacist to perform regular reassessments no more than (6) months from the previous assessment.  Care Coordinator to set up future refill outreaches, coordinate prescription delivery, and escalate clinical questions to pharmacist.     Attestation  Therapeutic appropriateness: Appropriate  I attest the patient was actively involved in and has agreed to the above plan of care. If the prescribed therapy is at any point deemed not appropriate based on the current or future assessments, a consultation will be initiated with the patient's specialty care provider to determine the best course of action. The revised plan of therapy will be documented along with any additional patient education provided. Discussed aforementioned material with patient by phone.    Timo Puentes RPH  7/2/2025  14:37 EDT

## 2025-08-01 ENCOUNTER — SPECIALTY PHARMACY (OUTPATIENT)
Dept: NEUROLOGY | Facility: CLINIC | Age: 40
End: 2025-08-01
Payer: COMMERCIAL

## 2025-08-01 NOTE — PROGRESS NOTES
Specialty Pharmacy Patient Management Program  [facility] Refill Outreach       Amal was contacted today regarding refills of their medication(s).    [facility] Questionnaire Responses      Flowsheet Row Questionnaire Series Submission from 8/1/2025 in Initial Department with Myesha, Generic Provider   Changes to allergies? No    Changes to medications? No    New conditions or infections since last clinic visit No    Unplanned office visit, urgent care, ED, or hospital admission in the last 4 weeks  No    How does patient/caregiver feel medication is working? Good    Financial problems or insurance changes  No    Since the previous refill, were any specialty medication doses or scheduled injections missed or delayed?  No    Delivery address Home    Doses left of specialty medications 0    Copay verified? Yes    Delivery Date Selection 08/05/25             Refill Questions      Flowsheet Row Most Recent Value   Does this patient require a clinical escalation to a pharmacist? No            Delivery Questions      Flowsheet Row Most Recent Value   Delivery method [employer]   Delivery address verified with patient/caregiver? Yes   Other address preferred n/a   Medication(s) being filled and delivered Erenumab-aooe (AIMOVIG)   Copay verified? Yes   Copay amount $5   Copay form of payment Credit/debit on file   Signature Required No   Do you consent to receive electronic handouts?  Yes                   Follow-up: 25 day(s)     Chanel Nguyen  8/1/2025  11:52 EDT

## (undated) DEVICE — ENDOCUT SCISSOR TIP, DISPOSABLE: Brand: RENEW

## (undated) DEVICE — TOTAL TRAY, 16FR 10ML SIL FOLEY, URN: Brand: MEDLINE

## (undated) DEVICE — SUT VIC 0/0 UR6 27IN DYED J603H

## (undated) DEVICE — GLV SURG BIOGEL LTX PF 6 1/2

## (undated) DEVICE — HARMONIC ACE +7 LAPAROSCOPIC SHEARS ADVANCED HEMOSTASIS 5MM DIAMETER 36CM SHAFT LENGTH  FOR USE WITH GRAY HAND PIECE ONLY: Brand: HARMONIC ACE

## (undated) DEVICE — ENDOPATH XCEL UNIVERSAL TROCAR STABLILITY SLEEVES: Brand: ENDOPATH XCEL

## (undated) DEVICE — ADHS SKIN SURG TISS VISC PREMIERPRO EXOFIN HI/VISC FAST/DRY

## (undated) DEVICE — ENDOPATH XCEL BLADELESS TROCARS WITH STABILITY SLEEVES: Brand: ENDOPATH XCEL

## (undated) DEVICE — DISPOSABLE MONOPOLAR ENDOSCOPIC CORD 10 FT. (3M): Brand: KIRWAN

## (undated) DEVICE — LAPAROSCOPIC SMOKE FILTRATION SYSTEM: Brand: PALL LAPAROSHIELD® PLUS LAPAROSCOPIC SMOKE FILTRATION SYSTEM

## (undated) DEVICE — APPL CHLORAPREP HI/LITE 26ML ORNG

## (undated) DEVICE — PATIENT RETURN ELECTRODE, SINGLE-USE, CONTACT QUALITY MONITORING, ADULT, WITH 9FT CORD, FOR PATIENTS WEIGING OVER 33LBS. (15KG): Brand: MEGADYNE

## (undated) DEVICE — SOL NACL 0.9PCT 1000ML

## (undated) DEVICE — ENDOPOUCH RETRIEVER SPECIMEN RETRIEVAL BAGS: Brand: ENDOPOUCH RETRIEVER

## (undated) DEVICE — SUT MNCRYL PLS ANTIB UD 4/0 PS2 18IN

## (undated) DEVICE — LAPAROVUE VISIBILITY SYSTEM LAPAROSCOPIC SOLUTIONS: Brand: LAPAROVUE

## (undated) DEVICE — ENDOPATH PNEUMONEEDLE INSUFFLATION NEEDLES WITH LUER LOCK CONNECTORS 120MM: Brand: ENDOPATH

## (undated) DEVICE — ECHELON FLEX45 ENDOPATH STAPLER, ARTICULATING ENDOSCOPIC LINEAR CUTTER (NO CARTRIDGE): Brand: ECHELON ENDOPATH

## (undated) DEVICE — LOU LAP CHOLE: Brand: MEDLINE INDUSTRIES, INC.